# Patient Record
Sex: FEMALE | Race: WHITE
[De-identification: names, ages, dates, MRNs, and addresses within clinical notes are randomized per-mention and may not be internally consistent; named-entity substitution may affect disease eponyms.]

---

## 2017-06-13 NOTE — UC
Skin Complaint HPI





- HPI Summary


HPI Summary: 





53 yo female with the onset of left axillary discomfort earlier today


when she got home she noted redness surround skin graft site


had a BCC removed from left anterior shoulder


skin graft 3/17





no f/c


no myalgias











- History of Current Complaint


Chief Complaint: UCSkin


Time Seen by Provider: 06/13/17 18:22


Stated Complaint: SKIN GRAFT PROBLEM


Hx Obtained From: Patient, Family/Caretaker


Onset/Duration: Gradual Onset, Lasting Hours


Timing: Constant


Onset Severity: Mild


Current Severity: Moderate


Pain Intensity: 4


Character: Redness, Painful


Aggravating: Touch


Alleviating: Nothing


Associated Signs & Symptoms: Positive: Tenderness





- Allergy/Home Medications


Allergies/Adverse Reactions: 


 Allergies











Allergy/AdvReac Type Severity Reaction Status Date / Time


 


Adhesive Tape Allergy Intermediate very Verified 03/21/17 10:34





   sensitive  





   to  





   adhesives  











Home Medications: 


 Home Medications





Omeprazole 40 mg PO BID 06/13/17 [History Confirmed 06/13/17]











Review of Systems


Constitutional: Negative


Skin: Negative


Eyes: Negative


ENT: Negative


Respiratory: Negative


Cardiovascular: Negative


Gastrointestinal: Negative


Genitourinary: Negative


Motor: Negative


Neurovascular: Negative


Musculoskeletal: Negative


Neurological: Negative


Psychological: Negative


All Other Systems Reviewed And Are Negative: Yes





PMH/Surg Hx/FS Hx/Imm Hx


GI/ History: Gastroesophageal Reflux


Neurological History: Seizures


Cancer History: Breast Cancer





- Surgical History


Surgical History: Yes


Surgery Procedure, Year, and Place: RT BREAST LUMPECTOMY(CANCEROUS) 2002/03.  

ORIF right tibia 1999





- Social History


Alcohol Use: None


Substance Use Type: None


Smoking Status (MU): Never Smoked Tobacco





- Immunization History


Most Recent Influenza Vaccination: 2014


Most Recent Tetanus Shot: unknown, states up to date


Most Recent Pneumonia Vaccination: never





Physical Exam


Triage Information Reviewed: Yes


Appearance: Well-Appearing, No Pain Distress, Well-Nourished


Vital Signs: 


 Initial Vital Signs











Temp  97.0 F   06/13/17 17:10


 


Pulse  92   06/13/17 17:10


 


Resp  18   06/13/17 17:10


 


BP  153/91   06/13/17 17:10


 


Pulse Ox  95   06/13/17 17:10











Vital Signs Reviewed: Yes


Eyes: Positive: Conjunctiva Clear


ENT: Positive: Hearing grossly normal.  Negative: Nasal congestion, Nasal 

drainage, Trismus, Muffled/hoarse voice


Neck: Positive: Supple, Nontender, No Lymphadenopathy


Respiratory: Positive: Lungs clear, Normal breath sounds, No respiratory 

distress, No accessory muscle use


Cardiovascular: Positive: RRR, No Murmur


Neurological: Positive: Other: - right hemiparesis


Psychological Exam: Normal


Skin: Positive: Other - see image





Course/Dx





- Diagnoses


Provider Diagnoses: cellulitis





Discharge





- Discharge Plan


Condition: Stable


Disposition: HOME


Prescriptions: 


Cephalexin CAP* [Keflex CAP*] 500 mg PO QID #28 cap


Patient Education Materials:  Cellulitis (ED)


Referrals: 


Allan Rodriguez MD [Primary Care Provider] - 


Additional Instructions: 


This should get rechecked in 1-2 days





Call your plastic surgeon 











Images


Front/Back of Body, Lg (Mono): 


  __________________________














  __________________________





 1 - 10x 7 cm area of erthyma surrounding skin graft site/no fluctuance drainage

## 2017-10-07 ENCOUNTER — HOSPITAL ENCOUNTER (EMERGENCY)
Dept: HOSPITAL 25 - UCCORT | Age: 52
Discharge: HOME | End: 2017-10-07
Payer: MEDICARE

## 2017-10-07 VITALS — DIASTOLIC BLOOD PRESSURE: 87 MMHG | SYSTOLIC BLOOD PRESSURE: 157 MMHG

## 2017-10-07 DIAGNOSIS — R03.0: ICD-10-CM

## 2017-10-07 DIAGNOSIS — L25.9: Primary | ICD-10-CM

## 2017-10-07 PROCEDURE — G0463 HOSPITAL OUTPT CLINIC VISIT: HCPCS

## 2017-10-07 PROCEDURE — 99212 OFFICE O/P EST SF 10 MIN: CPT

## 2017-10-07 NOTE — UC
Skin Complaint HPI





- HPI Summary


HPI Summary: 





51 y/o female presents to the urgent care accompany by Social; Worker c/o rash 

in her mid chest that itches a lot for the past 3 days. she also states she has 

a black mole in the RT lateral side of her chest and she is concer it is 

cancerous. Pt states she had a BCC removed in 3/21/2017 removed by Dr Anna Chris. It got infected at the beginning, but now is healing well. She has 

not taking anything for the rash. Pt denies fever, pain, SOB, chest pain, N/V/

D. 





- History of Current Complaint


Chief Complaint: UCSkin


Time Seen by Provider: 10/07/17 11:17


Stated Complaint: SKIN COMPLAINT


Hx Obtained From: Patient, Family/Caretaker - Socail Worker


Hx Last Menstrual Period: menopausal


Pregnant?: No


Onset/Duration: Gradual Onset, Lasting Days - 3 days, Still Present


Skin Exposure Onset/Duration: Days Ago - 3


Timing: Constant


Onset Severity: Mild


Current Severity: Mild


Pain Intensity: 0


Pain Scale Used: 0-10 Numeric


Location: Discrete - mid chest


Character: Pruritus, Redness


Aggravating Factor(s): Touch


Alleviating Factor(s): Cold


Associated Signs & Symptoms: Positive: Negative.  Negative: Nausea, Vomiting, 

Fever, Chest Pain, Throat Tightening


Related History: Possible Reaction to: Environmental Exposure





- Allergy/Home Medications


Allergies/Adverse Reactions: 


 Allergies











Allergy/AdvReac Type Severity Reaction Status Date / Time


 


Adhesive Tape Allergy Intermediate very Verified 10/07/17 11:06





   sensitive  





   to  





   adhesives  











Home Medications: 


 Home Medications





Sertraline* [Zoloft*] 25 mg PO DAILY 10/07/17 [History Confirmed 10/07/17]


Sertraline* [Zoloft*] 50 mg PO DAILY 10/07/17 [History Confirmed 10/07/17]











Review of Systems


Constitutional: Negative


Skin: Rash - mid chest, Other - black mole in the RT lateral side of chest


Eyes: Negative


ENT: Negative


Respiratory: Negative


Cardiovascular: Negative


Gastrointestinal: Negative


Genitourinary: Negative


Motor: Negative


Neurovascular: Negative


Musculoskeletal: Negative


Neurological: Negative


Psychological: Negative


Is Patient Immunocompromised?: No


All Other Systems Reviewed And Are Negative: Yes





PMH/Surg Hx/FS Hx/Imm Hx


Previously Healthy: Yes


Endocrine History: Hypothyroidism


GI/ History: Gastroesophageal Reflux


Other GI/ History: Neurogenic bladder


Other Neurological History: Bell's Palsy


Cancer History: Breast Cancer


Other Cancer History: BCC of left anterior shoulder





- Surgical History


Surgical History: Yes


Surgery Procedure, Year, and Place: RT BREAST LUMPECTOMY(CANCEROUS) 2002/03.  

ORIF right tibia 1999





- Family History


Known Family History: Positive: Hypertension





- Social History


Occupation: Disabled


Lives: Group Home


Alcohol Use: None


Substance Use Type: None


Smoking Status (MU): Never Smoked Tobacco





- Immunization History


Most Recent Influenza Vaccination: 2014


Most Recent Tetanus Shot: unknown, states up to date


Most Recent Pneumonia Vaccination: never





Physical Exam


Triage Information Reviewed: Yes


Appearance: Well-Appearing, No Pain Distress, Well-Nourished, Obese


Vital Signs: 


 Initial Vital Signs











Temp  98.3 F   10/07/17 11:07


 


Pulse  66   10/07/17 11:07


 


Resp  16   10/07/17 11:07


 


BP  157/87   10/07/17 11:07


 


Pulse Ox  96   10/07/17 11:07











Vital Signs Reviewed: Yes


Eye Exam: Normal


Eyes: Positive: Conjunctiva Clear - PERRLA, EOMI


ENT: Positive: Normal ENT inspection, Hearing grossly normal, Pharynx normal, 

TMs normal - B/L eaternal ear canala clear


Neck: Positive: Supple, Nontender, No Lymphadenopathy


Respiratory: Positive: Chest non-tender, Lungs clear, Normal breath sounds, No 

respiratory distress


Cardiovascular: Positive: RRR, No Murmur, Pulses Normal, Brisk Capillary Refill


Abdomen Description: Positive: Nontender, No Organomegaly, Soft.  Negative: CVA 

Tenderness (R), CVA Tenderness (L)


Bowel Sounds: Positive: Present


Musculoskeletal: Positive: Strength Intact, ROM Intact, No Edema


Neurological Exam: Normal


Psychological Exam: Normal


Psychological: Positive: Normal Response To Family


Skin: Positive: rashes - mid chest with discrete erythematous discrete 

maculopapular eruption, non tender to palpation about [id number] in size. signs of 

scoriation observed, no signs of infection. Right Lateral side of chest eith a 

black mole with irregular borders, about [id number] in size, non tender to 

palpation.





Course/Dx





- Course


Course Of Treatment: 51 y/o female presents to the urgent care accompany by 

Social; Worker c/o rash in her mid chest that itches a lot for the past 3 days. 

she also states she has a black mole in the RT lateral side of her chest and 

she is concer it is cancerous. Pt states she had a BCC removed in 3/21/2017 

removed by Dr Anna Chris. It got infected at the beginning, but now 

is healing well. She has not taking anything for the rash. Pt denies fever, pain

, SOB, chest pain, N/V/D. Hx obtained. Pt with a possible contact dermatitis in 

her mid chest on examination. Pt Rx Triamcinolone Topical cream and Benadryl PO 

to alleviate pruritus. Pt and  strongly advised to f/u with 

Dermatologis Dr Chris for further evalaution in her mole. Pt's BP elevated 

today. Advised to decrease salt in his diet, monitor BP and if it continues to 

be elevated to f/u with PCP. Pt and  understood and agreed, left 

the clinic ambulating.





- Differential Diagnoses - Skin Complaint


Differential Diagnoses: Abscess, Cellulitis, Contact Dermatitis, Eczema, Local 

Allergic Reaction, Tinea, Urticaria





- Diagnoses


Provider Diagnoses: 1- Acute contact dermatitis of chest.  2-Elevated BP w/o Hx 

of HTN





Discharge





- Discharge Plan


Condition: Stable


Disposition: HOME


Prescriptions: 


Triamcinolone 0.1% CREAM(NF) [Kenalog Cream 0.1%(NF)] 1 applic TOPICAL BID #1 

tube


diPHENhydraMINE PO* [Benadryl PO 25 MG TAB*] 25 mg PO TID PRN #15 tab


 PRN Reason: Pruritis


Patient Education Materials:  Contact Dermatitis (ED), Low Sodium Diet (ED)


Referrals: 


Allan Rodriguez MD [Primary Care Provider] - 3 Days


Anna Chris [Medical Doctor] - 3 Days


Additional Instructions: 


1-Please apply medication as directed. Take Benadryl PO to alleviate itchiness. 


2-Please f/u with your Dermatologist Dr Chris for further evaluation and 

treatment on the mole


3-If symptoms do not improve or worsen please f/u with your PCP or return to 

the urgent care for further evaluation and treatment.


4- Your BP today is elevated, please decrease salt in your diet, monitor your BP

, if it continues to be elevated please f/y with your PCP for further 

management.

## 2019-02-03 ENCOUNTER — HOSPITAL ENCOUNTER (EMERGENCY)
Dept: HOSPITAL 25 - UCCORT | Age: 54
Discharge: HOME | End: 2019-02-03
Payer: MEDICARE

## 2019-02-03 VITALS — DIASTOLIC BLOOD PRESSURE: 95 MMHG | SYSTOLIC BLOOD PRESSURE: 163 MMHG

## 2019-02-03 DIAGNOSIS — S90.31XA: Primary | ICD-10-CM
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
## 2019-02-03 DIAGNOSIS — Z91.09: ICD-10-CM
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
## 2019-02-03 PROCEDURE — G0463 HOSPITAL OUTPT CLINIC VISIT: HCPCS

## 2019-02-03 PROCEDURE — 99212 OFFICE O/P EST SF 10 MIN: CPT

## 2019-02-03 NOTE — UC
Lower Extremity/Ankle HPI





- HPI Summary


HPI Summary: 





Dropped a tablet on the right foot. There is bruising and tenderness. No limp. 





- History of Current Complaint


Chief Complaint: UCLowerExtremity


Stated Complaint: RIGHT FOOT COMPLAINT


Time Seen by Provider: 02/03/19 09:00


Hx Obtained From: Patient, Family/Caretaker


Hx Last Menstrual Period: menopausal


Pregnant?: No


Onset/Duration: Sudden Onset, Lasting Days


Severity Initially: Mild


Severity Currently: Mild


Pain Intensity: 0


Aggravating Factor(s): Other - palpation.


Alleviating Factor(s): Rest


Able to Bear Weight: Yes





- Allergies/Home Medications


Allergies/Adverse Reactions: 


 Allergies











Allergy/AdvReac Type Severity Reaction Status Date / Time


 


Adhesive Tape Allergy Intermediate very Verified 02/03/19 08:51





   sensitive  





   to  





   adhesives  











Home Medications: 


 Home Medications





Budesonide [Budesonide ER] 3 mg PO DAILY 02/03/19 [History Confirmed 02/03/19]











PMH/Surg Hx/FS Hx/Imm Hx


Previously Healthy: No - MR. Group home. 





- Surgical History


Surgical History: Yes


Surgery Procedure, Year, and Place: RT BREAST LUMPECTOMY(CANCEROUS) 2002/03.  

ORIF right tibia 1999





- Family History


Known Family History: Positive: Hypertension





- Social History


Alcohol Use: None


Substance Use Type: None


Smoking Status (MU): Never Smoked Tobacco





- Immunization History


Most Recent Influenza Vaccination: 2014


Most Recent Tetanus Shot: unknown, states up to date


Most Recent Pneumonia Vaccination: never





Review of Systems


All Other Systems Reviewed And Are Negative: Yes


Musculoskeletal: Positive: Other: - bruising.





Physical Exam


Triage Information Reviewed: Yes


Appearance: Well-Appearing, No Pain Distress, Well-Nourished


Vital Signs: 


 Initial Vital Signs











Temp  97.6 F   02/03/19 08:49


 


Pulse  65   02/03/19 08:49


 


Resp  16   02/03/19 08:49


 


BP  163/95   02/03/19 08:49


 


Pulse Ox  95   02/03/19 08:49











Vital Signs Reviewed: Yes


Eyes: Positive: Conjunctiva Clear


ENT: Positive: Normal ENT inspection


Neck: Positive: Supple, Nontender, No Lymphadenopathy


Respiratory: Negative: Respiratory distress


Cardiovascular: Positive: Brisk Capillary Refill


Abdomen Description: Negative: Distended


Musculoskeletal: Positive: Strength Intact, ROM Intact, Other: - right dorsum 

foot bruising. THEre is right 1st metatarsal tenderness mid shaft.


Neurological: Positive: Alert, Muscle Tone Normal.  Negative: Fatigued


Psychological: Positive: Age Appropriate Behavior


Skin: Positive: Other - bruising..  Negative: Rashes





Diagnostics





- Radiology


  ** No standard instances


Radiology Interpretation Completed By: ED Physician - no fracture.





Lower Extremity Course/Dx





- Differential Dx/Diagnosis


Provider Diagnosis: 


 Contusion of right foot, initial encounter








Discharge





- Sign-Out/Discharge


Documenting (check all that apply): Patient Departure


All imaging exams completed and their final reports reviewed: No





- Discharge Plan


Condition: Good


Disposition: HOME


Patient Education Materials:  Foot Contusion (ED)


Referrals: 


Allan Rodriguez MD [Primary Care Provider] - 





- Billing Disposition and Condition


Condition: GOOD


Disposition: Home

## 2019-02-03 NOTE — XMS REPORT
Continuity of Care Document (CCD)

 Created on:2019



Patient:Marya Bella

Sex:Female

:1965

External Reference #:[id number]





Demographics







 Address  [address] [place] [id number]

 

 Home Phone  [phone]

 

 Mobile Phone  [phone]

 

 Preferred Language  en

 

 Marital Status  Not  or 

 

 Confucianism Affiliation  Unknown

 

 Race  White

 

 Ethnic Group  Not  or 









Author







 Name  Emma Pichardo PA-C

 

 Address  [address] [place] [address] Road



   Unavailable



   [place] [address]









Care Team Providers







 Name  Role  Phone

 

 Whaley,Julieth,NP  Care Team Information   Unavailable

 

 Julieth Whaley NP  Primary Care Physician  Unavailable









Payers







 Type  Date  Identification Numbers  Payment Provider  Subscriber

 

     Policy Number: [id number]  Medicare  Marya Bella









 PayID: [id number]  [facility]









 [address]

 

 [place] [address]









     Policy Number: [id number]  Medicaid  Marya Bella









 Group Name: 1 1  [facility]

 

 PayID: [id number]  [address]









 [place] [address]







Advance Directives







 Description

 

 No Information Available







Problems







 Date  Description  Provider  Status

 

 Onset: 2015  Gastroesophageal reflux disease  Sven Solorzano MD  Active

 

 Onset: 2015  Seizure  Sven Solorzano MD  Active

 

 Onset: 2015  Cerebral palsy  Sven Solorzano MD  Active

 

 Onset: 2015  Malignant neoplasm of female  Sven Solorzano MD  Active



   breast    

 

 Onset: 2018  Collagenous colitis  Emma Pichardo PA-C  Active

 

 Onset: 2018  Diarrhea  Emma Pichardo PA-C  Active

 

 Onset: 2018  Incontinence of feces  Emma Pichardo PA-C  Active







Family History







 Description

 

 No Information Available







Social History







 Type  Date  Description  Comments

 

 Birth Sex    Unknown  

 

 ETOH Use    Denies alcohol use  

 

 Tobacco Use  Start: Unknown  Patient has never smoked  

 

 Smoking Status  Reviewed: 19  Patient has never smoked  







Allergies, Adverse Reactions, Alerts







 Date  Description  Reaction  Status  Severity  Comments

 

 2015  NKDA    Active    

 

 2018  Adhesives    Active    







Medications







 Medication  Date  Status  Form  Strength  Qnty  SIG  Indications  Ordering



                 Provider

 

 Budesonide    Active  Caps DR Part  3mg  30cap  take 1  K52.831  Lucy



           s  capsule by    Foor-Pess



             mouth once    in, MD



             daily    

 

 Metamucil    Active  Packet  28%  30uni  1 packet  R15.9  Emma



 Smooth Texture          ts  mixed with 8    L.



 Fiber Singles            oz. of water    Swanstrom



             daily 2    , PA-C



             hours before    



             or after    



             other    



             meds-give AT    



             6 am daily    

 

 Ferrous  12/15  Active  Tablets  324(37.5F  60tab  use  1    Breiman,



 Gluconate  /      e) mg  s  tablet  bid    MD Allan

 

 Caltrate 600+D  10/09  Active  Tablets  600-800mg  60tab  Take 1    Breiman,



         -Unit  s  Tablet By    Tamiko Lemus Twice    MD



             A Day    



             (Osteoporosi    



             s)    

 

 Bacitracin    Active  Ointment  500Unit/G  30uni  apply to    Breiman,



 (External)        M  ts  small    Allan



             cuts/nancy HOFF



             ns as needed    

 

 Nystatin    Active  Cream  [id number]  30uni  Apply Under    Breiman,



         t/GM  ts  Breast And    Allan



             Abdomin Area    MD



             Folds as    



             Needed    



             (Fungal    



             Rash)    

 

 Xanax    Active  Tablets  0.5mg  2tabs  1 by mouth    Kikiimalatricia          prior to    Allan



             exam, may    MD



             repeat once    

 

 Phenytoin    Active  Capsules  100mg  30cap  1 by mouth    Unknown



 Sodium  /0000        s  every day in    



 Extended            morning and    



             2 by mouth    



             qpm    

 

 Omeprazole    Active  Capsules DR  40mg  60cap  1 by mouth    Sven A.



   /0000        s  twice a day    Rashad HOFF

 

 Sertraline HCL    Active  Tablets  25mg        Unknown



   /0000              

 

 Oxybutynin    Active  Tablets ER  10mg        Unknown



 Chloride ER  /0000    24HR          

 

 Oxybutynin    Active  Tablets ER  5mg        Unknown



 Chloride ER  /0000    24HR          

 

 Levothyroxine    Active  Tablets  50mcg        Unknown



 Sodium  /0000              

 

 Sertraline HCL    Active  Tablets  50mg        Unknown



   /0000              

 

 Acetaminophen    Active  Tablets  325mg    2 by mouth    Unknown



   /0000          every 8    



             hours as    



             needed    

 

 Biscolax    Active  Suppository  10mg    1 per rectum    Unknown



   /0000          every day as    



             needed    



             constipation    

 

 Milk Of    Active  Suspension  1200mg/15    30ml by    Unknown



 Magnesia  /0000      ML    mouth every    



             day as    



             needed    



             constipation    



             if no bm in    



             3 days    

 

 Mupirocin    Active  Ointment  2%    apply    Unknown



   /0000          topically    



             three times    



             a day to    



             affected    



             area(s)    

 

 Guaifenesin    Active  Liquid  100mg/5ML    10ml by    Unknown



   /0000          mouth four    



             times a day    



             as needed    



             cough    

 

                 

 

 Colyte With    Hx  Solution Rec  240gm  4000m  by mouth as    Emma



 Flavor Packs          l  directed    L.



   -              Swanstrom



   07/10              , PA-C



                 

 

 Budesonide    Hx  Caps DR Part  3mg  60cap  2 every day    Emma



   /        s      L.



   -              Swanstrom



                 , PA-C



                 

 

 Ondansetron    Hx  Tablets  4mg        Unknown



   /0000    Dispers          



   -              



                 







Immunizations







 Description

 

 No Information Available







Vital Signs







 Date  Vital  Result  Comment

 

 2019 10:11am  Height  60 inches  5'0"









 Weight  150.00 lb  

 

 BP Systolic  140 mmHg  

 

 BP Diastolic  96 mmHg  

 

 Heart Rate  66 /min  

 

 BMI (Body Mass Index)  29.3 kg/m2  









 2018  9:22am  Height  60 inches  5'0"









 Weight  139.00 lb  

 

 BP Systolic  162 mmHg  

 

 BP Diastolic  90 mmHg  

 

 Heart Rate  60 /min  

 

 BMI (Body Mass Index)  27.1 kg/m2  









 2018 10:56am  Height  60 inches  5'0"









 Weight  135.00 lb  

 

 BP Systolic  140 mmHg  

 

 BP Diastolic  92 mmHg  

 

 Heart Rate  62 /min  

 

 BMI (Body Mass Index)  26.4 kg/m2  









 2016  2:08pm  Height  64 inches  5'4"









 Weight  165.00 lb  

 

 BP Systolic  159 mmHg  

 

 BP Diastolic  96 mmHg  

 

 Heart Rate  90 /min  

 

 BMI (Body Mass Index)  28.3 kg/m2  









 2015  1:56pm  Height  64 inches  5'4"









 Weight  141.00 lb  

 

 BP Systolic  138 mmHg  

 

 BP Diastolic  90 mmHg  

 

 Heart Rate  66 /min  

 

 BMI (Body Mass Index)  24.2 kg/m2  







Results







 Test  Date  Facility  Test  Result  H/L  Range  Note

 

 Laboratory test    [facility]  Surgical  SEE RESULT      1



 finding  8    Interface  BELOW      



       Order        

 

 SOB X 3 (Gai)    [facility]  SOB 1St Sample  NEG    
  



   8  [facility]  ([facility])        



     [place] [address] [phone]          









 SOB 2ND Sample (Gai)  NEG      

 

 SOB 3RD Sample (Gai)  NEG      









 CBC W/Auto  2018  [facility]  White  6.1 3/UL    4.8-
10.8  



 Differential(!)    [facility]  Blood        



     [place] [address]  Count Ser        



     [phone]  Auto CNT        









 RBC Red Blood Count  4.69 X106/UL    4.20-6.20  

 

 Hemoglobin Blood  14.8 g/dL    12.0-18.0  

 

 Hematocrit  45.9 %    35-52  

 

 MCV (Corpuscular Volume)  97.8 FL  High  79-97  

 

 MCH (Corpuscular Hemoglobin)  31.6 pg  High  27-31  

 

 MCHC (Corpuscular Hemog Conc)  32.3 g/dL    32.0-36.0  

 

 RDW  16.1 %  High  10.5-15.0  

 

 Platelet Count Blood Auto CNT  225 X103/UL    150-450  

 

 MPV  8.2 FL    7.4-10.4  

 

 Lymph%  30.3 %    20.0-45.0  

 

 Mono%  12.1 %  High  1.0-9.0  

 

 Neutrophil %  57.6 %    38.0-83.0  

 

 Absolute Lymphocytes  1.8 X103/UL    1.0-4.8  

 

 Absolute Monocytes  0.7 X103/UL    0.0-0.8  

 

 Absolute Neutrophils  3.5 X103/UL    1.5-7.7  









 Laboratory test  2018  [facility]  Ferritin  77.1 ng/dL
      



 finding    [facility] [facility]  Ser/Plas        



     [place] [address]  Mass/Vol(!)        



     [phone]          

 

 Iron & Iron  2018  [facility]  Iron  147 g/dL  N    



 Binding Capacity              









 Unsaturated Iron Binding  157 g/dL      

 

 Total Iron Binding Capacity  304 g/dL  N  250-450  

 

 Transferrin  217 mg/dL  N  203-362  

 

 % Iron Saturation  48 %  N  15-55  









 Laboratory test  2017  N2N/CCD Import  Hematocrit  30.3 %  Low  36.1 -  



 finding      (Fma/CMC/CTX)      50.3  









 Hemoglobin ([facility]ma/[facility]/CTX)  9.0 g/dL  Low  12.1 - 17.2  

 

 Lymph%  52.4 %  High  17.0-48.0  

 

 Mean Corpuscular Hemo Concen  29.6 1  Low  32.0-36.0  

 

 Mean Corpuscular Hemoglobin  17.9 1  Low  27.6-33.3  

 

 Mean Corpuscular Vol  61 1  Low  82.2-97.4  

 

 Mean Platelet Volume  9.7 1    5.5-11.0  

 

 Mixed%  5.6 1      

 

 Neutrophils %  42.0 1      

 

 Platelets  375 10^3/uL    150-400  

 

 RBC  5.00 1    3.90-5.70  

 

 RDW  19.8 1  High  11.6-13.7  

 

 WBC  6.5 1    3.6-9.6  









 Laboratory test  2016  [facility]  Surgical  SEE RESULT      2, 3



 finding      Interface Order  BELOW      

 

 Laboratory test  2016  N2N/CCD Import  Hematocrit  28.9 %  Low  36.1 -  



 finding      (Fma/CMC/CTX)      50.3  









 Hemoglobin (Fma/CMC/CTX)  8.9 g/dL  Low  12.1 - 17.2  

 

 Lymph%  31.3 %    17.0-48.0  

 

 Mean Corpuscular Hemo Concen  30.7 1  Low  32.0-36.0  

 

 Mean Corpuscular Hemoglobin  20.0 1  Low  27.6-33.3  

 

 Mean Corpuscular Vol  65 1  Low  82.2-97.4  

 

 Mean Platelet Volume  8.5 1    5.5-11.0  

 

 Mixed%  5.8 1      

 

 Neutrophils %  62.9 1      

 

 Platelets  332 10^3/uL    150-400  

 

 RBC  4.44 1    3.90-5.70  

 

 RDW  18.7 1  High  11.6-13.7  

 

 WBC  6.9 1    3.6-9.6  









 Laboratory test  06/15/2015  [facility]  Surgical  SEE RESULT      4



 finding      Interface Order  BELOW      

 

 Laboratory test  2015  N2N/CCD Import  Hematocrit  30.2 %  Low  36.1 -  



 finding      (Fma/CMC/CTX)      50.3  









 Hemoglobin (Fma/CMC/CTX)  9.0 g/dL  Low  12.1 - 17.2  

 

 Lymph%  24.8 %    17.0-48.0  

 

 Mean Corpuscular Hemo Concen  29.7 1  Low  32.0-36.0  

 

 Mean Corpuscular Hemoglobin  19.7 1  Low  27.6-33.3  

 

 Mean Corpuscular Vol  66 1  Low  82.2-97.4  

 

 Mean Platelet Volume  6.9 1    5.5-11.0  

 

 Mixed%  4.5 1      

 

 Neutrophils %  70.7 1      

 

 Platelets  404 10^3/uL  High  150-400  

 

 RBC  4.57 1    3.90-5.70  

 

 RDW  24.5 1  High  11.6-13.7  

 

 WBC  7.9 1    3.6-9.6  









 CBC Auto Diff  2015  N2N/CCD Import  Abs Basophils  0.1 10^3/uL    0-0.2
  5









 Abs Eosinophils  0.1 10^3/uL    0-0.6  

 

 Abs Lymphocytes  1.4 10^3/uL    1.0-4.8  

 

 Abs Monocytes  0.6 10^3/uL    0-0.8  

 

 Abs Neutrophils  6.6 10^3/uL    1.5-7.7  

 

 Abs Nucleated RBC  0 10^3/uL      

 

 Basophil %  0.6 %    0-2  

 

 Eosinophil %  0.8 %    0-6  

 

 Granulocyte %  76.2 %    38-83  

 

 Hematocrit  20 %  Low  35-47  

 

 Hemoglobin  5.9 g/dL  Low  12.0-16.0  6

 

 Lymphocyte %  15.7 %  Low  25-47  

 

 Mean Corpuscular HGB Conc  30 g/dL  Low  31-36  

 

 Mean Corpuscular Hemoglobin  17 pg  Low  27-31  

 

 Mean Corpuscular Volume  56 fL  Low  80-97  

 

 Mean Platelet Volume  9 um3    7.4-10.4  

 

 Monocyte %  6.7 %    1-9  

 

 Nucleated Red Blood Cells %  0 1      

 

 Platelet Count  337 10^3/uL    150-450  

 

 Red Blood Count  3.48 10^6/uL  Low  4.0-5.4  

 

 Red Cell Distribution Width  21 %  High  10.5-15  

 

 White Blood Count  8.6 10^3/uL    4.8-10.8  









 Cell Morphology  2015  N2N/CCD Import  Elliptocyte  1+      









 Hypochromasia  3+      

 

 Microcytosis  2+      









 1  SEE RESULT BELOW



   -----------------------------------------------------------------------------
---------------



   Name:  MARYA BELLA               : 1965    Attend Dr: Tanya Gale DO



   Acct:  [id number]  Unit: [id number]  AGE: 53            Location:  [facility]



   Re/07/18                        SEX: F             Status:    DEP REF



   -----------------------------------------------------------------------------
---------------



   



   SPEC: [id number]             PRIYANKA:       [facility] DR: Tanya Gale DO



   REQ:  [id number]             RECD: 



   STATUS: SOFÍA TAPIA DR: Allan Rodriguez MD



   _



   ORDERED:  LEVEL 4



   



   FINAL DIAGNOSIS



   



   



   Colon, random biopsies:



   -- Mild to moderate collagenous colitis.



   -- No active colitis or evidence of other chronic inflammatory bowel process 
identified.



   



   



   



   CLINICAL HISTORY



   



   52 year old female here for fecal incontinence, diarrhea, anemia



   



   POST-OPERATIVE DIAGNOSIS



   



   Colonoscopy: normal terminal ileum; small non-bleeding internal hemorrhoids; 
no polyps; good



   prep; biopsies for microscopic colitis



   



   GROSS DESCRIPTION



   



   The specimen is received in formalin labeled, Random Colon Biopsies, and 
consists of a 1.0



   by up to 0.7 x 0.2 cm aggregate of tan irregular soft tissue fragments which 
is submitted



   entirely in one cassette.



   



   Signed by and Reported on: __________              Kingsley Abernathy MD  1254



   



   -----------------------------------------------------------------------------
---------------



   



   



   



   



   



   



   



   



   



   



   ** END OF REPORT **



   



   DEPARTMENT OF PATHOLOGY,  [address] [place] [address]



   Phone # [phone]      Fax #[phone]



   Kingsley Abernathy M.D. Director     [facility] # [id number]



   



   SEE RESULT BELOW



   -----------------------------------------------------------------------------
---------------



   Name:  MARYA BELLA               : 1965    Attend Dr: Tanya Gale DO



   Acct:  [id number]  Unit: [id number]  AGE: 53            Location:  [facility]



   Re18                        SEX: F             Status:    DEP REF



   -----------------------------------------------------------------------------
---------------



   



   SPEC: [id number]             PRIYANKA:       [facility] DR: Tanya Gale DO



   REQ:  [id number]             RECD: 



   STATUS: SOFÍA TAPIA DR: Allan Rodriguez MD



   _



   ORDERED:  LEVEL 4



   



   FINAL DIAGNOSIS



   



   



   Colon, random biopsies:



   -- Mild to moderate collagenous colitis.



   -- No active colitis or evidence of other chronic inflammatory bowel process 
identified.



   



   



   



   CLINICAL HISTORY



   



   52 year old female here for fecal incontinence, diarrhea, anemia



   



   POST-OPERATIVE DIAGNOSIS



   



   Colonoscopy: normal terminal ileum; small non-bleeding internal hemorrhoids; 
no polyps; good



   prep; biopsies for microscopic colitis



   



   GROSS DESCRIPTION



   



   The specimen is received in formalin labeled, Random Colon Biopsies, and 
consists of a 1.0



   by up to 0.7 x 0.2 cm aggregate of tan irregular soft tissue fragments which 
is submitted



   entirely in one cassette.



   



   Signed by and Reported on: __________              Kingsley Abernathy MD  1254



   



   -----------------------------------------------------------------------------
---------------



   



   



   



   



   



   



   



   



   



   



   ** END OF REPORT **



   



   DEPARTMENT OF PATHOLOGY,  [address] [place] [address]



   Phone # [phone]      Fax #[phone]



   Kingsley Abernathy M.D. Director     [facility] # [id number]

 

 2  [id number]

 

 3  SEE RESULT BELOW



   -----------------------------------------------------------------------------
---------------



   Name:  MARYA BELLA               : 1965    Attend Dr: Sven Solorzano MD



   Acct:  [id number]  Unit: [id number]  AGE: 51            Location:  [facility]



   Re16                        SEX: F             Status:    REG REF



   -----------------------------------------------------------------------------
---------------



   



   SPEC: [id number]             PRIYANKA:       [facility] DR: Sven Solorzano MD



   REQ:  [id number]             RECD: 16-



   STATUS: SOFÍA TAPIA DR: Allan Rodriguez MD



   _



   ORDERED:  LEVEL IV



   COMMENTS: [id number]



   



   FINAL DIAGNOSIS



   



   



   Esophagus, distal, biopsy:



   -- Squamous and columnar mucosa with chronic inflammation and focal 
intestinal metaplasia.



   -- Negative for dysplasia.



   



   



   



   CLINICAL HISTORY



   



   Dysphagia, esophagitis, history of Jeffries's



   



   POST-OPERATIVE DIAGNOSIS



   



   Esophagus - short segment Jeffries's biopsied, slight Schatzki's ring dilated
; stomach -



   hiatal hernia, otherwise normal gastric mucosa; duodenum - normal bulb to 
third portion.



   Conclusions/Plan: Short segment Jeffries's biopsied, slight Schatzki's ring 
dilated, hiatal



   hernia



   



   GROSS DESCRIPTION



   



   The specimen is received in formalin labeled, Biopsy Distal Esophagus, and 
consists of a 0.7



   x 0.6 x 0.2 cm aggregate of tan irregular soft tissue fragments, which is 
submitted entirely



   in one cassette.



   



   Signed __________(signature on file)___________ Tahira Springer MD  1127



   



   -----------------------------------------------------------------------------
---------------



   



   



   



   



   



   



   



   ** END OF REPORT **



   



   * ML=Testing performed at Main Lab



   DEPARTMENT OF PATHOLOGY,  [address] [place] [address]



   Phone # [phone]      Fax #[phone]



   Kingsley Abernathy M.D. Director     [facility] # [id number]



   



   SEE RESULT BELOW



   -----------------------------------------------------------------------------
---------------



   Name:  MARYA BELLA               : 1965    Attend Dr: Sven Solorzano MD



   Acct:  [id number]  Unit: [id number]  AGE: 51            Location:  [facility]



   Re16                        SEX: F             Status:    REG REF



   -----------------------------------------------------------------------------
---------------



   



   SPEC: [id number]             PRIYANKA:       [facility] DR: Sven Solorzano MD



   REQ:  [id number]             RECD: 



   STATUS: SOFÍA TAPIA DR: Allan Rodriguez MD



   _



   ORDERED:  LEVEL IV



   COMMENTS: [id number]



   



   FINAL DIAGNOSIS



   



   



   Esophagus, distal, biopsy:



   -- Squamous and columnar mucosa with chronic inflammation and focal 
intestinal metaplasia.



   -- Negative for dysplasia.



   



   



   



   CLINICAL HISTORY



   



   Dysphagia, esophagitis, history of Jeffries's



   



   POST-OPERATIVE DIAGNOSIS



   



   Esophagus - short segment Jeffries's biopsied, slight Schatzki's ring dilated
; stomach -



   hiatal hernia, otherwise normal gastric mucosa; duodenum - normal bulb to 
third portion.



   Conclusions/Plan: Short segment Jeffries's biopsied, slight Schatzki's ring 
dilated, hiatal



   hernia



   



   GROSS DESCRIPTION



   



   The specimen is received in formalin labeled, Biopsy Distal Esophagus, and 
consists of a 0.7



   x 0.6 x 0.2 cm aggregate of tan irregular soft tissue fragments, which is 
submitted entirely



   in one cassette.



   



   Signed __________(signature on file)___________ Tahira Springer MD  1127



   



   -----------------------------------------------------------------------------
---------------



   



   



   



   



   



   



   



   ** END OF REPORT **



   



   * ML=Testing performed at Main Lab



   DEPARTMENT OF PATHOLOGY,  [address] [place] [address]



   Phone # [phone]      Fax #[phone]



   Kingsley Abernathy M.D. Director     [facility] # [id number]

 

 4  SEE RESULT BELOW



   -----------------------------------------------------------------------------
---------------



   Name:  MARYA BELLA               : 1965    Attend Dr: Sven Solorzano MD



   Acct:  [id number]  Unit: [id number]  AGE: 50            Location:  [facility]



   Re/15/15                        SEX: F             Status:    REG REF



   -----------------------------------------------------------------------------
---------------



   



   SPEC: [id number]             PRIYANKA: 06/15/15-          SUBM DR: Sven Solorzano MD



   REQ:  [id number]             RECD: 06/15/



   STATUS: SOFÍA TAPIA DR: Allan Rodriguez MD



   _



   ORDERED:  LEVEL IV



   



   FINAL DIAGNOSIS



   



   



   Gastroesophageal junction, biopsy:



   -- Focally eroded squamous and columnar mucosa with acute and chronic 
inflammation and



   intestinal metaplasia.



   -- Negative for dysplasia.



   



   



   



   



   CLINICAL HISTORY



   



   Gastroesophageal reflux disease, dysphagia



   



   POST-OPERATIVE DIAGNOSIS



   



   Esophagus - stricture at GE junction, dilated, ? Jeffries's biopsied, 
negative esophagitis;



   stomach - 3 cm. hiatal hernia, normal gastric mucosa; duodenum - normal bulb 
to third



   portion. Esophageal stricture dilated, hiatal hernia



   



   GROSS DESCRIPTION



   



   The specimen is received in formalin labeled, Biopsy GE Junction, and 
consists of a 0.6 x



   0.4 x 0.2 cm aggregate of multiple tan irregular soft tissue fragments, 
which is submitted



   entirely in one cassette.



   



   Signed __________(signature on file)___________ Tahira Springer MD 06/
17/15 1148



   



   -----------------------------------------------------------------------------
---------------



   



   



   



   



   



   



   



   ** END OF REPORT **



   



   * ML=Testing performed at Main Lab



   DEPARTMENT OF PATHOLOGY,  [address] [place] [address]



   Phone # [phone]      Fax #[phone]



   Kingsley Abernathy M.D. Director     [facility] # [id number]



   



   SEE RESULT BELOW



   -----------------------------------------------------------------------------
---------------



   Name:  MARYA BELLA               : 1965    Attend Dr: Sven Solorzano MD



   Acct:  [id number]  Unit: [id number]  AGE: 50            Location:  [facility]



   Re/15/15                        SEX: F             Status:    REG REF



   -----------------------------------------------------------------------------
---------------



   



   SPEC: [id number]             PRIYANKA: 06/15/15-          SUBM DR: Sven Solorzano MD



   REQ:  [id number]             RECD: 06/15/



   STATUS: SOFÍA TAPIA DR: Allan Rodriguez MD



   _



   ORDERED:  LEVEL IV



   



   FINAL DIAGNOSIS



   



   



   Gastroesophageal junction, biopsy:



   -- Focally eroded squamous and columnar mucosa with acute and chronic 
inflammation and



   intestinal metaplasia.



   -- Negative for dysplasia.



   



   



   



   



   CLINICAL HISTORY



   



   Gastroesophageal reflux disease, dysphagia



   



   POST-OPERATIVE DIAGNOSIS



   



   Esophagus - stricture at GE junction, dilated, ? Jeffries's biopsied, 
negative esophagitis;



   stomach - 3 cm. hiatal hernia, normal gastric mucosa; duodenum - normal bulb 
to third



   portion. Esophageal stricture dilated, hiatal hernia



   



   GROSS DESCRIPTION



   



   The specimen is received in formalin labeled, Biopsy GE Junction, and 
consists of a 0.6 x



   0.4 x 0.2 cm aggregate of multiple tan irregular soft tissue fragments, 
which is submitted



   entirely in one cassette.



   



   Signed __________(signature on file)___________ Tahira Springer MD 06/
17/15 1148



   



   -----------------------------------------------------------------------------
---------------



   



   



   



   



   



   



   



   ** END OF REPORT **



   



   * ML=Testing performed at Main Lab



   DEPARTMENT OF PATHOLOGY,  [address] [place] [address]



   Phone # [phone]      Fax #[phone]



   Kingsley Abernathy M.D. Director     [facility] # [id number]

 

 5  NO LAV RECIEVED CLARY NOTIFIED @1535

 

 6  Verbal to [id number] ED by [id number] at 1855 on 05/07/15.



   Results read back accurately.







Procedures







 Date  Code  Description  Status

 

 2016  88837  EGD/Ball. Dilation-Less Than 30mm  Completed

 

 2016  56442  EGD+Biopsy Single Or Multiple  Completed

 

 2015  02524  EGD/Ball. Dilation-Less Than 30mm  Completed

 

 06/15/2015  27127  EGD/Ball. Dilation-Less Than 30mm  Completed

 

 06/15/2015  22933  EGD+Biopsy Single Or Multiple  Completed

 

 2015  16171  EGD+Biopsy Single Or Multiple  Completed







Encounters







 Type  Date  Location  Provider  Dx  Diagnosis

 

 Office Visit  2018  [facility]  Emma MONTELONGO  K52.831  Collagenous



    9:30a  [facility]  Kylee    colitis



       PA-TAD    

 

 Office Visit  2018  [facility]  Emma MONTELONGO  R15.9  Full 
incontinence



   11:15a  [facility]    [facility]



       PA-C    









 R19.7  Diarrhea, unspecified

 

 D64.9  Anemia, unspecified









 Office Visit  2016  [facility]  Sven VELEZ  R13.10  Dysphagia,



   2:15p  [facility]    unspecified









 K22.2  Esophageal obstruction









 Office Visit  2015  [facility]  Sven VELEZ  787.20  Dysphagia,



   1:30p  [facility]    Unspecified







Plan of Treatment

2019 - OLEGARIO Courtney-CK52.831 Collagenous colitisNew Medication
:Budesonide 3 mg - take 1 capsule by mouth once daily

## 2019-02-03 NOTE — XMS REPORT
Continuity of Care Document (CCD)

 Created on:2019



Patient:Kati Bella

Sex:Female

:1965

External Reference #:[id number]





Demographics







 Address  [address] [place] [address]

 

 Home Phone  [phone]

 

 Mobile Phone  [phone]

 

 Work Phone  [phone]

 

 Preferred Language  en

 

 Marital Status  Not  or 

 

 Bahai Affiliation  Unknown

 

 Race  White

 

 Ethnic Group  Not  or 









Author







 Name  Tahira JAGUAR Mann

 

 Address  [address]



   Unavailable



   [place] [address]









Care Team Providers







 Name  Role  Phone

 

 Allan Rodriguez MD  Care Team Information   Unavailable

 

 Allan Rodriguez MD  Primary Care Physician  Unavailable









Payers







 Type  Date  Identification Numbers  Payment Provider  Subscriber

 

     Policy Number: [id number]  Medicare [facility]  Kati Bella









 PayID: [id number]  [address]









 [place] [address]









     Policy Number: [id number]  Medicaid [place]  Kati Bella









 PayID: [id number]  [address]









 [place] [address]







Advance Directives







 Description

 

 No Information Available







Problems







 Date  Description  Provider  Status

 

 Onset: 2014  Arthralgia of the lower leg  Allan Rodriguez M.D.  Active

 

 Onset: 2014  Hypothyroidism  Allan Rodriguez M.D.  Active

 

 Onset: 2014  Posterior rhinorrhea  Allan Rodriguez M.D.  Active

 

 Onset: 2015  Peptic reflux disease  Allan Rodriguez M.D.  Active

 

 Onset: 2015  Seizure  Allan Rodriguez M.D.  Active

 

 Onset: 2015  Cerebral palsy  Allan Rodriguez M.D.  Active

 

 Onset: 2015  Arthralgia of the ankle and/or foot  Allan Rodriguez M.D.  Active

 

 Onset: 2016  Pain in limb  Allan Rodriguez M.D.  Active

 

 Onset: 2017  Dysphagia  Allan Rodriguez M.D.  Active







Family History







 Description

 

 No Information Available







Social History







 Type  Date  Description  Comments

 

 Birth Sex    Unknown  

 

 Lives With    Roommate  in their own apartment



       with supervision from



       [facility] Staff

 

 Tobacco Use  Start: Unknown  Patient has never smoked  

 

 Smoking Status  Reviewed: 18  Patient has never smoked  







Allergies, Adverse Reactions, Alerts







 Description

 

 No Known Drug Allergies







Medications







 Medication  Date  Status  Form  Strength  Qnty  SIG  Indications  Ordering



                 Provider

 

 Benadryl Allergy    Active  Capsules  25mg  14cap  1 po q 12  L29.9  
Tahira        s  hrs prn    Johnathan,



             itching    FNP

 

 Zoloft    Active  Tablets  50mg  30tab  Take One  F41.9  Allan YANET



   /        s  Tablet By    Breiman,



             Mouth    M.D.



             Every    



             Morning    



             (Depressi    



             on)    

 

 Zoloft    Active  Tablets  25mg  30tab  Take One    Allan ADONAY.



   /        s  Tablet By    Breiman,



             Mouth    M.D.



             Every    



             Morning    



             (Depressi    



             on)    

 

 Ferrous  12/15  Active  Tablets  324(38Fe)  60tab  use  1    Allan HOLT



 Gluconate  /      mg  s  tablet    Breiman,



             once    M.D.



             daily    

 

 Caltrate 600+D  10/09  Active  Tablets  600-800mg  60tab  Take 1    Allan HOLT



   /      -Unit  s  Tablet By    Breiman,



             Mouth    M.D.



             Twice A    



             Day    



             (Osteopor    



             osis)    

 

 Multivitamin  10/09  Active      30uni  Take One    Allan HOLT



 Daily          ts  Tablet By    Breiman,



             Mouth    M.D.



             Every Day    



             (Suppleme    



             nt)    

 

 Prilosec    Active  Capsules DR  40mg  60cap  Take One    Allan J.



           s  Capsule    Breiman,



             By Mouth    M.D.



             2 Times A    



             Day    



             (GERD) as    



             Needed    

 

 Disposable    Active    Size  300Mo  Use as    Allan HOLT



 Underpads        Large  nth  needed    Jennifer



             for    M.D.



             incontine    



             nce dx:    



             r32    

 

 Oxybutynin    Active  Tablets ER  10mg  30tab  take 1    Allan HOLT



 Chloride ER  /    24HR    s  tablet by    Breiman,



             mouth    M.D.



             every day    

 

 Bed Pads    Active      1PK  use on  R32  Allan HOLT



   /          bed    Jennifer,



             nightly    M.D.



             as needed    



             Dx:    



             788.30    

 

 Large Disposable    Active      300un  pt uses    Allan HOLT



 Pull-Ups  /        its  10 daily    Jennifer,



             dx:    M.D.



             urinary    



             incontine    



             nce,    



             neurogeni    



             c bladder    



             r32    

 

 Dilantin    Active  Capsules  100mg  105ca  1 cap by  R56.9          ps  mouth    Johnathan,



             every day    FNP



             in in the    



             morning    



             and 2    



             caps by    



             mouth at    



             bedtime    

 

 Synthroid  01/10  Active  Tablets  50mcg  30tab  Take One  E03.9  Allan URIAS.



           s  Tablet By    Jennifer,



             Mouth    M.D.



             Every Day    



             (Hypothyr    



             oidism)    

 

 Metamucil Smooth    Active  Packet  28%        Unknown



 Texture Fiber  /0000          packet    



 Singles            with    



             large    



             glass    



             water    



             every in    



             the    



             morning,    



             every at    



             night    

 

                 

 

 Bactroban    Hx  Ointment  2%  22uni  Apply    Allan HOLT



           ts  Sparingly    Jennifer,



   -          To    M.D.



             Area(S)    



             Twice    



             Daily as    



             Needed    



             (Antibact    



             erial)    

 

 Zoloft    Hx  Tablets  25mg  30tab  Take One  F41.9  Allan URIAS.



           s  Tablet By    Jennifer,



   -          Mouth    M.D.



   11/06          Every    



   /2018          Morning    



             (Depressi    



             on)    

 

 Zoloft    Hx  Tablets  50mg  30tab  Take One  F41.9  Allan URIAS.



           s  Tablet By    Jennifer,



   -          Mouth    M.D.



   06/22          Every    



   /2018          Morning    



             (Depressi    



             on)    

 

 Zoloft    Hx  Tablets  50mg  30tab  Take One  F41.9  Allan URIAS.



           s  Tablet By    Jennifer,



   -          Mouth    M.D.



   01/02          Every    



   /2018          Morning    



             (Depressi    



             on)    

 

 Zoloft    Hx  Tablets  25mg  30tab  Take One  F41.9  Allan URIAS.



           s  Tablet By    Jennifer,



   -          Mouth    M.D.



   05/21          Every    



   /2018          Morning    



             (Depressi    



             on)    

 

 Note    Hx        No    Allan HOLT



             physical    Jennifer



   -          available    M.D.



             /          until Dec    



             2017    

 

 Zoloft    Hx  Tablets  50mg  30tab  Take One    Allan HOLT



           s  Tablet By    Jennifer,



   -          Mouth    M.D.



   12/05          Every    



   /2017          Morning    



             (Depressi    



             on)    

 

 Bacitracin    Hx  Ointment  500Unit/G  30gm  apply to    Allan HOLT



 (External)        Annette Warren          cuts/marvel    M.D.



             sions as    



   /2019          needed    

 

 Naprosyn    Hx  Tablets  375mg  14tab  1 by    Allan HOLT



           s  mouth    Jennifer,



   -          twice a    M.D.



             day with    



   /2017          meals    

 

 Work  Excuse    Hx        unable to    Allan HOLT



             work the    Jennifer,



   -          next  1-2    M.D.



   12/05           days    



   /2017          due  to    



             knee    



             injury    

 

 Clobetasol    Hx  Cream  0.05%  30gm  use twice    Allan HOLT



 Propionate  /2016          adadelfina Rodriguez,



   -          Shoulder    M.D.



             Rash    



                 

 

 Zoloft    Hx  Tablets  25mg  30tab  Take One  F41.9  Allan HOLT



           s  Tablet By    Jennifer,



   -          Mouth    M.D.



   01/02          Every    



   /2018          Morning    



             (Depressi    



             on)    

 

 Sertraline HCL    Hx  Tablets  50mg  30tab  Take 1            s  Tablet By    Angelo,



   -          Mouth One    FNP



   01/24          Time    



   /2019          Daily    

 

 Prednisone    Hx  Tablets  20mg  18tab  3 x 3    Allan HOLT



           s  days 2 x    Jennifer,



   -          3 days 1    M.D.



   08/17          x 3 days    



   /2016              

 

 Calcium    Hx  Tablets  600-400mg  60tab  Take 1    Allan HOLT



 Carbonate-Vit      -Unit  s  Tablet By    latricia Rodriguez D  -          Mouth    M.D.



   10/09          With    



   /2017          Breakfast    



             And Take    



             1 Tablet    



             By Mouth    



             With    



             Dinner    

 

 Note  11/10  Hx        pt needs    Allan HOLT



             aso    Jennifer,



   -          adjusted    M.D.



                 

 

 Physical Therapy    Hx        treatment    Allan HOLT



             and    Jennifer,



   -          evaluatio    M.D.



             n for    



             gait    



             training    



              and   r    



             hand    



             contractu    



             res    

 

 Omeprazole    Hx  Capsules DR  40mg  60cap  Take 1    Allan HOLT



           s  Capsule    Jennifer,



   -          By Mouth    M.D.



             Two Times    



             Daily    

 

 Prilosec    Hx  Capsules DR  20mg  30cap  1 by    Allan HOLT



           s  mouth bid    Jennifer,



   -              M.D.



                 

 

 Zofran Odt    Hx  Tablets  4mg  10tab  1 by    Allan HOLT



       Dispers    s  mouth    Jennifer,



   -          every 4-6    M.D.



             hours as    



   /2015          needed    



             nausea    

 

 Guiatuss ac    Hx  Syrup  100-10mg/  6FLOz  use 1-2    Allan HOLT



         5ML    teaspoon    Breiman,



   -          every 4    M.D.



             our               

 

 Amoxicillin    Hx  Capsules  500mg  16cap  twice a    Allan HOLT



           s  day x 8    Breiman,



   -          days    M.D.



                 

 

 Tab-A-Rhiannon    Hx  Tablets    100ta  Take 1            bs  Tablet By    Angelo,



   -          Mouth    FNP



             Every Day    



                 

 

 Calcium    Hx  Tablets  600-400mg  60tab  Take 1    Jacquelyn



 Carbonate-Vit      -Unit  s  Tablet By    Angelo,



 n D  -          Mouth    FNP



             Daily    



             With    



             Breakfast    



             And Take    



             1 Tablet    



             By Mouth    



             With    



             Dinner    

 

 Calcium 600-D    Hx  Tablets  600-400mg  60tab  1 with    Allan HOLT



         -Unit  s  breakfast    Breiman,



   -          and 1    M.D.



   01/03          with    



   /2016          dinner    

 

 Note No Work  07/15  Hx        Kati was              seen by    Angelo,



   -          me today    FNP



             and may    



   /2014          go back    



             to work    



             on [employer],    



             \    

 

 Product Code  07/10  Hx    Medium  180un  dx:    Jacquelyn



 BR      Brief  its  788.23 Pt    Angelo,



   -          uses 6/d    FN



                 

 

 Zoloft  07/10  Hx  Tablets  25mg  30tab  Take 1            s  Tablet By    Angelo,



   -          Mouth One    FNP



   04/02          Time    



   /2016          Daily    

 

 Disposable    Hx    Size  300Mo  Use as    Jacquelyn



 Underp      Large  nth  needed    Angelo,



   -          for    FNP



             incontin          nce dx:    



             788.37    

 

 Benzonatate    Hx  Capsules  200mg  60cap  1 po tid            s  prn for    Angelo,



   -          cough    FNP



                 

 

 Oxybutynin    Hx  Tablets ER  5mg  30tab  1 by    Allan HOLT



 Chloride ER      24HR    s  mouth    Breiman,



   -          every day    M.D.



                 

 

 Depends    Hx      2Case  medium  788.30          s  pull-up    Angelo,



   -          for    FNP



   07/12          incontin          nce waist    



             36" hip    



             40"    



             height    



             61"    



             weight    



             146 lbs -    



             pt uses    



             6/d    

 

 Bed Pads    Hx      1PK  use on  788.30            bed    Angelo,



   -          nightly    FNP



             prn    



                 

 

 Cipro    Hx  Tablets  500mg  14tab  1 po bid  599.0          s  for 7    Angelo,



   -          days    FNP



                 

 

 Ibuprofen    Hx  Tablets  400mg  90tab  1 tab by            s  mouth    Angelo,



   -          three    FNP



             times a    



             day    

 

 Ibuprofen    Hx  Tablets  200mg  100ta  2 po  719.47          bs  twice    Angelo,



   -          daily    FNP



             with bk          and lunch    



             on work    



             days- use    



             prn order    



             if    



             additiona    



             l doses    



             needed    

 

 Systane Ultra    Hx  Solution  Drops  1unit  Instill 1            s  Drop Both    Angelo,



   -          Eyes qid    FNP



                 

 

 Refresh P.M.    Hx  Ointment    1Tube  Apply To              Both    Angelo,



   -          Eyelids    FNP



             AT    



             Bedtime    

 

 Benzonatate    Hx  Capsules  100mg  50cap  2 Caps            s  tid prn    Angelo,



   -          Cough    FNP



   02/10              



   /2014              

 

 Diphenhydramine    Hx  Capsules  25mg  30cap  1-2 po    Jacquelyn



 HCL  /2012        s  q8hrs prn    Angelo,



   -          allergic    FNP



             reaction    



                 

 

 Acetaminophen    Hx  Tablets  325mg  100ta  2 po  q4            bs  hrs prn    Angelo,



   -          fever    FNP



             >100 or    



   /2014          pain    

 

 Robitussin-DM    Hx  Syrup  100-10mg/  1Bott  10ml po    Allan J.



         5ML  le  q4hrs prn    Jennifer,



   -          cough    M.D.



   02/10              



   /2014              

 

 Nystatin    Hx  Cream  419078Use  30uni  Apply    Allan J.



         t/GM  ts  Under    Kikiimalatricia,



   -          Breast    M.D.



             And    



             Abdomin    



             Area    



             Folds as    



             Needed    



             (Fungal    



             Rash)    

 

 Pyridium    Hx  Tablets  200mg  12tab  1 po tid            s  x 4d    Angelo,



   -              FNP



                 

 

 Ciprofloxacin    Hx  Tablets  250mg  14tab  1 po bid  599.0  Jacquelyn



 HCL  /2012        s  x 7d    Angelo,



   -              FNP



                 

 

 Mycolog-II    Hx  Cream    30gm  apply  599.0            bid-tid    Angelo,



   -          to    FNP



             affected    



             area    

 

 Synthroid    Hx  Tablets  25mcg  30tab  1 po qd    Jacquelyn        s      Angelo,



   -              FNP



   01/10              



   /2012              

 

 Bacitracin    Hx  Ointment  500Unit/G  30gm  apply to          M    small    Angelo,



   -          cuts/marvel    FNP



             sions as    



   /2017          needed    

 

 Allergy Cream    Hx  Cream  2%  45GR  apply to              bites    Angelo,



   -          four    FNP



   12/05          times    



   /2017          daily as    



             needed    

 

 Tessalon Perles    Hx  Capsules  100mg  30cap  one to  465.9  Levin A.



           s  two yobany Gaitan M.D.



   -          po tid    



             prn cough    



                 

 

 Xanax    Hx  Tablets  0.5mg  2tabs  1 by    Allan HOLT



             mouth    Breiman,



   -          prior to    M.D.



             exam, may    



   /2019          repeat    



             once    

 

 Nasal  06/15  Hx  Tablets  30mg  30tab  2 po q 4    Jacquelyn



 Decongestant          s  hrs prn    Angelo,



   -          congestio    FNP



                 

 

 Benadryl    Hx    25mg  30uni  1-2 po q            ts  8hrs prn    Jarrod,



   -          rash    Afnp-C



                 

 

 Zoloft    Hx  Tablets  50mg  30tab  Take 1  300.00  Jacquelyn        s  Tablet By    Angelo,



   -          Mouth One    FNP



             Time    



             Daily    

 

 Zoloft    Hx  Tablets  25mg  30tab  1 po qd  300.00  Jacquelyn



           s      Angelo,



   -              FNP



                 

 

 Elastic Ankle    Hx      2unit  use  782.3  Jacquelyn



 Brace          s  during    Angelo,



   -          the day    FNP



             for two    



   /2009          or until    



             the ankle    



             edema    



             resolves    

 

 Calcium-Vitamin    Hx  Tablets  600/400  60tab  1 With    



 D          s  Breakfast    Angelo,



   -          And 1    FNP



             With    



             Dinner    



             qd    

 

 Calcium 600 + D    Hx  Tablets  600-400mg  60tab  1 with          -Unit  s  breakfast    Angelo,



   -          and 1    FNP



             with    



             dinner    

 

 Hearing Eval    Hx        PT Needs    Allan HOLT



             To Have A    Jennifer



   -          Hearing    M.D.



             Evaluatio    



             n For    



             Hearing    



             Loss And    



             Hearing    



             Aides If    



             Needed    

 

 Wrist Splint    Hx        RT wrist  343.9  Allan HOLT



             splint to    Jennifer,



   -          prevent    MERNESTO



             es    

 

 Calcium+Vit.D    Hx    600mg  30uni  1 PO qd            ts      Angelo,



   -              FNP



                 

 

 Physical Therapy  08/10  Hx        treatment    Allan HOLT



             and    Jennifer,



   -          evaluatisailaja GRAY



             n for    



             cerebral    



             palsy    



             for    



             aquathera    



             py    

 

 Braces    Hx        R Arm    Allan HOLT



             Brace    Annette Rodriguez M.D.



   08/10              



   /2006              

 

 Zithromax    Hx  Capsules  250mg  6caps  2 Tabs PO    Thu



   /2006          X1, Then    [place],



   -          1 Tab PO    Afnp-C



             qd X           More Days    

 

 Hydrocortisone    Hx    2.5%  30gm  tid prn    Jacquelyn



 Cream  /2005          to    Angelo,



   -          affected    FNP



   12/05          areas    



   /2017              

 

 Note    Hx        PT Needs    Allan HOLT



   /          Evaluatio    Jennifer,



   -          n From    M.DEthan



             Orthotics    



             For New    



             Right Arm    



             Brace    

 

 Physical Therapy    Hx        treatment    Allan HOLT



             and    Jennifer



   -          evaluatisailaja GRAY



   10/28          n           right    



             arm/hand    



             x 4-6    



             wks.    



                 



                 



             attn:    



             Ewa    

 

 Xanax    Hx  Tablets  0.25mg  2tabs  1 prior    Jacquelyn          to exam    Angelo,



   -              FNP



                 

 

 Shoes    Hx        needs a    Allan HOLT



   /          hard sole    Jennifer,



   -          shoe due    M.DAPHNE



             to    



             fracture    

 

 Ibuprofen    Hx    400mg  100un  1 po    Allan HOLT



   /        its  q4hrs prn    Jennifer,



   -          menstral    M.DAPHNE



             cramps/          eletal    



             muscle    



             pain. do    



             not use    



             tylenol/i    



             buprofen    



             concurren    



             tly    

 

 Tylenol #3    Hx      30uni  1 or 2 q    Jacquelyn



   /        ts  6h prn    Angelo,



   -          pain    FNP



                 

 

 Walker With    Hx      1unit  as    Jacquelyn



 Wheel        s  directed.    Angelo,



   -              FNP



                 



             dx: r    



             foot fx    

 

 Return To Work  02/10  Hx        kati has    Jacquelyn          a r foot    Angelo,



   -          fracture    FNP



             and           not    



             return to    



             work    



             until    



             cleared    



             by the    



             orthopedi    



             st    

 

 Tamoxifen    Hx    10mg  100un  1 po bid  V10.3  Family



   /2004        its      Medicine



   -              [facility]



                 

 

 Calcium    Hx    500mg  500un  2 po q hs    Family



   /2004        its      Medicine



   -              [facility]



                 

 

 Depends    Hx    Large  2Case  pullups  788.30  Allan HOLT



 Undergarm        s  use as    Jennifer,



   -          directed    M.DAPHNE



                  PT    



             uses 2    



             cases    



             every    



             month    

 

 Handicap Sticker    Hx        Permanent    Allan HOLT



             Handicape    Jennifer,



   -          Childhood    M.D.



                 

 

 Vitamin C    Hx  Tablets  500mg  30tab  take 1    Allan HOLT



           s  tablet by    Jennifer,



   -          mouth one    M.DAPHNE



   08/17          time    



   /2016          daily    

 

 Ditropan XL    Hx    10mg  0unit  One PO qd    Family



   /2001        s      Medicine



   -              [facility]               

 

 Duratuss GP    Hx      6unit  1 PO qam            s  prn Head    Angelo,



   -          Congestio    FNP



                 

 

 Detrol LA    Hx    1mg  0unit  1 bid prn    Family



   /2001        s  For    Medicine



   -          Bladder    Associ[facility]



             Symptoms    Of               

 

 Orthodic    Hx        custom    Allan HOLT



             right    Jennifer,



   -          hand/wris    M.DEthan



             orthotic    

 

 Multivitamin  10/20  Hx      100un  1 qd            its      Angelo,



   -              FNP



                 

 

 Tums    Hx        1 po qd    Family



   /2000              Medicine



   -              [facility]              

 

 Handicapped    Hx        Requires    Allan HOLT



 Parking            Handicapp    Jennifer



   -          ed    M.DEthan



             Parking    



                 



             For 6    



             Months    



             Due To    



             Broken    



             Leg    

 

 Prinivil    Hx    5mg  30uni  1 PO Q0D    Allan HOLT



   /        zuleika Rodriguez



   -              M.DEthan



   10/20              



   /2000              

 

 Accupril    Hx    5mg  30uni  1 qd    Allan HOLT



   /        Annette Russo              M.DEthan



                 

 

 Dilantin    Hx    100mg  105un  1 po qod  780.39  Allan HOLT



   /        its  alternati    Jennifer,



   -          ng with 2    M.D.



             caps qod    



             and 2    



             caps qpm    

 

 Dilantin    Hx    100mg  60uni  1-2 PO qd    Allan YANET



   /              Annette Rodriguez              M.DEthan



                 

 

 Dilantin    Hx  Capsules  100mg    1 PO qd  780.39  Unknown



   /0000              



   -              



                 

 

 Dilantin    Hx  Capsules  100mg  105ca  3 caps po  780.39  Allan J.



   /0000        ps  qod alt    Jennifer,



   -          with 4    M.D.



   03/28          caps    



   /2012              

 

 Acetaminophen    Hx  Tablets  325mg    2 by    Unknown



   /0000          mouth    



   -          every 8    



   /24          hours as    



   /2019          needed    

 

 Biscolax    Hx  Suppository  10mg    1 per    Unknown



   /0000          rectum    



   -          every day    



             as needed    



             constipat    



             ion    

 

 Milk Of Magnesia    Hx  Suspension  7.75%    30ml by    Unknown



   /0000          mouth    



   -          every day    



             as needed    



   2019          constipat    



             ion if no    



             bm in 3    



             days    

 

 Mupirocin    Hx  Ointment  2%    apply    Unknown



   /0000          topically    



   -          three    



             times           day to    



             affected    



             area(s)    

 

 Guaifenesin    Hx  Liquid  100mg/5ML    10ml by    Unknown



   /0000          mouth    



   -          four    



             times           day as    



             needed    



             cough    







Immunizations







 CPT Code  Status  Date  Vaccine  Lot #

 

 [id number]  Given  2018  Influenza Vac, Quadrivalent, Slit Virus, Im  

 

   Given  10/25/2014  Split Influenza Medicare: Fluzone  [id number]

 

 [id number]  Given  10/15/2012  DO Not Use Split Influenza Virus Vaccine  

 

 42217  Given  2010  Tdap Tetanus, W Pertussis  [id number]







Vital Signs







 Date  Vital  Result  Comment

 

 2019 10:17am  BP Systolic  130 mmHg  









 BP Diastolic  70 mmHg  

 

 Heart Rate  80 /min  

 

 Body Temperature  97.3 F  

 

 Respiratory Rate  20 /min  

 

 Weight  146.00 lb  









 12/10/2018 11:30am  BP Systolic  132 mmHg  









 BP Diastolic  90 mmHg  

 

 Heart Rate  72 /min  

 

 Body Temperature  97.6 F  

 

 Respiratory Rate  16 /min  

 

 Height  58.25 inches  4'10.25"

 

 Weight  147.12 lb  

 

 BMI (Body Mass Index)  30.5 kg/m2  









 2018  8:57am  BP Systolic  170 mmHg  









 BP Diastolic  100 mmHg  

 

 Heart Rate  70 /min  

 

 Body Temperature  97.9 F  

 

 Height  58.5 inches  4'10.50"

 

 Weight  139.00 lb  

 

 BMI (Body Mass Index)  28.6 kg/m2  









 2017  1:14pm  BP Systolic  142 mmHg  









 BP Diastolic  84 mmHg  

 

 Heart Rate  76 /min  

 

 Body Temperature  97.9 F  

 

 Respiratory Rate  12 /min  









 2017  6:01pm  BP Systolic  160 mmHg  









 BP Diastolic  100 mmHg  

 

 Heart Rate  86 /min  

 

 Body Temperature  97.9 F  

 

 Height  58.5 inches  4'10.50"

 

 Weight  147.38 lb  

 

 BMI (Body Mass Index)  30.3 kg/m2  









 2017  3:05pm  BP Systolic  128 mmHg  









 BP Diastolic  82 mmHg  

 

 Heart Rate  72 /min  

 

 Body Temperature  98.6 F  

 

 Respiratory Rate  18 /min  

 

 Height  58.5 inches  4'10.50"

 

 Weight  158.00 lb  

 

 BMI (Body Mass Index)  32.5 kg/m2  









 2016  3:43pm  BP Systolic  136 mmHg  









 BP Diastolic  80 mmHg  

 

 Heart Rate  68 /min  

 

 Body Temperature  97.9 F  

 

 Respiratory Rate  20 /min  

 

 Height  58.5 inches  4'10.50"

 

 Weight  163.50 lb  

 

 BMI (Body Mass Index)  33.6 kg/m2  









 2016  3:37pm  BP Systolic  126 mmHg  









 BP Diastolic  80 mmHg  

 

 Heart Rate  74 /min  

 

 Body Temperature  97.8 F  

 

 Respiratory Rate  18 /min  

 

 Height  58.5 inches  4'10.50"

 

 Weight  164.00 lb  

 

 BMI (Body Mass Index)  33.7 kg/m2  









 2016  2:17pm  BP Systolic  128 mmHg  









 BP Diastolic  80 mmHg  

 

 Heart Rate  68 /min  

 

 Body Temperature  98.7 F  

 

 Respiratory Rate  18 /min  

 

 Height  61 inches  5'1"

 

 Weight  158.00 lb  

 

 BMI (Body Mass Index)  29.9 kg/m2  









 2015 12:55pm  BP Systolic  126 mmHg  









 BP Diastolic  80 mmHg  

 

 Heart Rate  64 /min  

 

 Body Temperature  98.5 F  

 

 Respiratory Rate  20 /min  

 

 Height  61 inches  5'1"

 

 Weight  156.00 lb  

 

 BMI (Body Mass Index)  29.5 kg/m2  









 2015  3:46pm  BP Systolic  142 mmHg  









 BP Diastolic  102 mmHg  

 

 Heart Rate  72 /min  

 

 Body Temperature  98.4 F  

 

 Height  61 inches  5'1"

 

 Weight  157.00 lb  

 

 BMI (Body Mass Index)  29.7 kg/m2  









 07/10/2015  1:50pm  BP Systolic  124 mmHg  









 BP Diastolic  80 mmHg  

 

 Heart Rate  62 /min  

 

 Body Temperature  97.8 F  

 

 Respiratory Rate  16 /min  

 

 Height  61 inches  5'1"

 

 Weight  144.00 lb  

 

 BMI (Body Mass Index)  27.2 kg/m2  









 2015  3:35pm  BP Systolic  140 mmHg  









 BP Diastolic  80 mmHg  

 

 Heart Rate  64 /min  

 

 Body Temperature  98.3 F  

 

 Respiratory Rate  20 /min  

 

 Height  61 inches  5'1"

 

 Weight  143.00 lb  

 

 BMI (Body Mass Index)  27.0 kg/m2  









 2015  2:10pm  BP Systolic  140 mmHg  









 BP Diastolic  80 mmHg  

 

 Heart Rate  64 /min  

 

 Body Temperature  98.7 F  

 

 Respiratory Rate  20 /min  

 

 Height  61 inches  5'1"

 

 Weight  142.00 lb  

 

 BMI (Body Mass Index)  26.8 kg/m2  









 2015  2:18pm  BP Systolic  120 mmHg  









 BP Diastolic  84 mmHg  

 

 Heart Rate  76 /min  

 

 Body Temperature  98.9 F  

 

 Respiratory Rate  16 /min  

 

 Height  61 inches  5'1"

 

 Weight  144.00 lb  

 

 BMI (Body Mass Index)  27.2 kg/m2  









 2015 11:03am  BP Systolic  112 mmHg  









 BP Diastolic  72 mmHg  

 

 Heart Rate  111 /min  

 

 Body Temperature  99.4 F  

 

 O2 % BldC Oximetry  96 %  

 

 Height  61 inches  5'1"

 

 Weight  144.00 lb  

 

 BMI (Body Mass Index)  27.2 kg/m2  









 2014  2:28pm  BP Systolic  128 mmHg  









 BP Diastolic  82 mmHg  

 

 Heart Rate  86 /min  

 

 Body Temperature  98.5 F  

 

 Respiratory Rate  14 /min  

 

 Height  61 inches  5'1"

 

 Weight  161.50 lb  

 

 BMI (Body Mass Index)  30.5 kg/m2  









 2014  2:12pm  BP Systolic  120 mmHg  









 BP Diastolic  80 mmHg  

 

 Heart Rate  92 /min  

 

 Body Temperature  99.2 F  

 

 Respiratory Rate  16 /min  

 

 Height  61 inches  5'1"

 

 Weight  160.00 lb  

 

 BMI (Body Mass Index)  30.2 kg/m2  









 07/15/2014  4:58pm  BP Systolic  138 mmHg  









 BP Diastolic  90 mmHg  

 

 Heart Rate  68 /min  

 

 Body Temperature  98.2 F  

 

 Respiratory Rate  18 /min  

 

 Height  61 inches  5'1"

 

 Weight  157.00 lb  

 

 BMI (Body Mass Index)  29.7 kg/m2  









 2014 10:06am  BP Systolic  122 mmHg  









 BP Diastolic  80 mmHg  

 

 Heart Rate  68 /min  

 

 Body Temperature  97.6 F  

 

 Respiratory Rate  18 /min  

 

 Height  61 inches  5'1"

 

 Weight  154.00 lb  

 

 BMI (Body Mass Index)  29.1 kg/m2  









 2014  8:51am  BP Systolic  148 mmHg  









 BP Diastolic  96 mmHg  

 

 Heart Rate  68 /min  

 

 Body Temperature  98.4 F  

 

 Respiratory Rate  16 /min  

 

 Height  61 inches  5'1"

 

 Weight  146.25 lb  

 

 BMI (Body Mass Index)  27.6 kg/m2  









 2014  9:09am  BP Systolic  124 mmHg  









 BP Diastolic  88 mmHg  

 

 Heart Rate  68 /min  

 

 Body Temperature  98.5 F  

 

 Respiratory Rate  68 /min  

 

 Height  61 inches  5'1"

 

 Weight  147.00 lb  

 

 BMI (Body Mass Index)  27.8 kg/m2  









 2014  4:07pm  BP Systolic  124 mmHg  









 BP Diastolic  80 mmHg  

 

 Heart Rate  66 /min  

 

 Body Temperature  97.9 F  

 

 Respiratory Rate  18 /min  

 

 Height  61 inches  5'1"

 

 Weight  150.00 lb  

 

 BMI (Body Mass Index)  28.3 kg/m2  









 02/10/2014  3:36pm  BP Systolic  130 mmHg  









 BP Diastolic  80 mmHg  

 

 Heart Rate  64 /min  

 

 Body Temperature  97.7 F  

 

 Respiratory Rate  18 /min  

 

 Height  61 inches  5'1"

 

 Weight  147.00 lb  

 

 BMI (Body Mass Index)  27.8 kg/m2  









 2013 10:27am  BP Systolic  124 mmHg  









 BP Diastolic  90 mmHg  

 

 Heart Rate  68 /min  

 

 Body Temperature  97.6 F  

 

 Respiratory Rate  16 /min  

 

 Height  61 inches  5'1"

 

 Weight  132.00 lb  

 

 BMI (Body Mass Index)  24.9 kg/m2  









 2012 10:05am  BP Systolic  118 mmHg  









 BP Diastolic  72 mmHg  

 

 Heart Rate  72 /min  

 

 Respiratory Rate  14 /min  

 

 Height  60.25 inches  5'0.25"

 

 Weight  113.00 lb  

 

 BMI (Body Mass Index)  21.9 kg/m2  









 2012  3:38pm  BP Systolic  122 mmHg  









 BP Diastolic  80 mmHg  

 

 Heart Rate  62 /min  

 

 Body Temperature  97.7 F  

 

 Height  60.25 inches  5'0.25"

 

 Weight  110.00 lb  

 

 BMI (Body Mass Index)  21.3 kg/m2  









 2011  9:02am  BP Systolic  120 mmHg  









 BP Diastolic  80 mmHg  

 

 Heart Rate  68 /min  

 

 Height  60.25 inches  5'0.25"

 

 Weight  115.00 lb  

 

 BMI (Body Mass Index)  22.3 kg/m2  









 10/27/2011  9:19am  BP Systolic  124 mmHg  









 BP Diastolic  84 mmHg  

 

 Heart Rate  52 /min  

 

 Height  60.25 inches  5'0.25"

 

 Weight  111.00 lb  

 

 BMI (Body Mass Index)  21.5 kg/m2  









 2011 11:36am  BP Systolic  102 mmHg  









 BP Diastolic  70 mmHg  

 

 Heart Rate  56 /min  

 

 Height  60.25 inches  5'0.25"

 

 Weight  112.00 lb  

 

 BMI (Body Mass Index)  21.7 kg/m2  









 2011 12:02pm  BP Systolic  106 mmHg  









 BP Diastolic  82 mmHg  

 

 Heart Rate  64 /min  

 

 Body Temperature  98.7 F  

 

 Height  60.25 inches  5'0.25"

 

 Weight  120.00 lb  

 

 BMI (Body Mass Index)  23.2 kg/m2  









 2011 10:06am  BP Systolic  120 mmHg  









 BP Diastolic  84 mmHg  

 

 Heart Rate  68 /min  

 

 Height  60.25 inches  5'0.25"

 

 Weight  125.00 lb  

 

 BMI (Body Mass Index)  24.2 kg/m2  









 2010  3:30pm  BP Systolic  100 mmHg  









 BP Diastolic  70 mmHg  

 

 Heart Rate  72 /min  

 

 Body Temperature  98.8 F  

 

 Height  60.25 inches  5'0.25"

 

 Weight  130.00 lb  

 

 BMI (Body Mass Index)  25.2 kg/m2  









 2010  4:05pm  BP Systolic  120 mmHg  









 BP Diastolic  70 mmHg  

 

 Heart Rate  80 /min  

 

 Body Temperature  98.3 F  

 

 Weight  129.00 lb  









 2010  3:04pm  BP Systolic  130 mmHg  









 BP Diastolic  80 mmHg  

 

 Heart Rate  64 /min  

 

 Body Temperature  98.9 F  

 

 Respiratory Rate  28 /min  

 

 Weight  127.00 lb  









 2010  4:10pm  BP Systolic  122 mmHg  









 BP Diastolic  80 mmHg  

 

 Heart Rate  56 /min  

 

 Body Temperature  99.0 F  

 

 Respiratory Rate  36 /min  

 

 O2 % BldC Oximetry  98 %  

 

 Weight  127.00 lb  









 2010  2:03pm  BP Systolic  114 mmHg  









 BP Diastolic  70 mmHg  

 

 Heart Rate  72 /min  

 

 Body Temperature  99.3 F  

 

 Height  60.25 inches  5'0.25"

 

 Weight  129.00 lb  

 

 BMI (Body Mass Index)  25.0 kg/m2  









 2010  1:05pm  BP Systolic  112 mmHg  









 BP Diastolic  72 mmHg  

 

 Heart Rate  76 /min  

 

 Height  60.25 inches  5'0.25"

 

 Weight  128.00 lb  

 

 BMI (Body Mass Index)  24.8 kg/m2  









 2010 11:18am  BP Systolic  120 mmHg  









 BP Diastolic  80 mmHg  

 

 Heart Rate  76 /min  

 

 Body Temperature  98.3 F  

 

 Height  60.25 inches  5'0.25"

 

 Weight  136.00 lb  

 

 BMI (Body Mass Index)  26.3 kg/m2  









 2009 10:37am  BP Systolic  130 mmHg  









 BP Diastolic  100 mmHg  

 

 Heart Rate  78 /min  

 

 Body Temperature  99.1 F  

 

 Weight  136.00 lb  









 2009  1:54pm  BP Systolic  102 mmHg  









 BP Diastolic  60 mmHg  

 

 Heart Rate  76 /min  

 

 Body Temperature  99.4 F  

 

 Height  60.25 inches  5'0.25"

 

 Weight  140.00 lb  

 

 BMI (Body Mass Index)  27.1 kg/m2  









 2009  3:06pm  BP Systolic  120 mmHg  









 BP Diastolic  80 mmHg  

 

 Heart Rate  68 /min  

 

 Weight  138.00 lb  









 2009  4:38pm  BP Systolic  120 mmHg  









 BP Diastolic  80 mmHg  

 

 Heart Rate  80 /min  

 

 Body Temperature  98.3 F  

 

 Weight  138.00 lb  









 2009  2:41pm  BP Systolic  110 mmHg  









 BP Diastolic  80 mmHg  

 

 Heart Rate  72 /min  

 

 Body Temperature  98.8 F  

 

 Height  60.25 inches  5'0.25"

 

 Weight  138.00 lb  

 

 BMI (Body Mass Index)  26.7 kg/m2  









 2008  2:26pm  BP Systolic  110 mmHg  









 BP Diastolic  68 mmHg  

 

 Heart Rate  76 /min  

 

 Body Temperature  98.5 F  

 

 Height  60.75 inches  5'0.75"

 

 Weight  139.00 lb  

 

 BMI (Body Mass Index)  26.5 kg/m2  









 2007  2:14pm  BP Systolic  112 mmHg  









 BP Diastolic  72 mmHg  

 

 Heart Rate  88 /min  

 

 Height  61 inches  5'1"

 

 Weight  128.00 lb  

 

 BMI (Body Mass Index)  24.2 kg/m2  









 2007  2:32pm  BP Systolic  100 mmHg  









 BP Diastolic  60 mmHg  

 

 Heart Rate  72 /min  

 

 Height  61 inches  5'1"

 

 Weight  127.00 lb  

 

 BMI (Body Mass Index)  24.0 kg/m2  









 08/10/2006  9:21am  BP Systolic  120 mmHg  









 BP Diastolic  70 mmHg  

 

 Heart Rate  68 /min  

 

 Height  61 inches  5'1"

 

 Weight  116.00 lb  

 

 BMI (Body Mass Index)  21.9 kg/m2  









 2006  9:34am  BP Systolic  110 mmHg  









 BP Diastolic  76 mmHg  

 

 Heart Rate  64 /min  

 

 Height  61 inches  5'1"

 

 Weight  117.00 lb  

 

 BMI (Body Mass Index)  22.1 kg/m2  









 2006  2:54pm  BP Systolic  110 mmHg  









 BP Diastolic  70 mmHg  

 

 Heart Rate  60 /min  

 

 Height  61 inches  5'1"

 

 Weight  117.00 lb  

 

 BMI (Body Mass Index)  22.1 kg/m2  









 2006  4:21pm  BP Systolic  100 mmHg  









 BP Diastolic  70 mmHg  

 

 Body Temperature  99.2 F  

 

 Height  61 inches  5'1"

 

 Weight  118.00 lb  

 

 BMI (Body Mass Index)  22.3 kg/m2  









 2005  3:04pm  BP Systolic  120 mmHg  









 BP Diastolic  78 mmHg  

 

 Heart Rate  78 /min  

 

 Body Temperature  97.6 F  

 

 Respiratory Rate  11 /min  

 

 Height  61 inches  5'1"

 

 Weight  150.00 lb  

 

 BMI (Body Mass Index)  28.3 kg/m2  









 2005 11:07am  BP Systolic  104 mmHg  









 BP Diastolic  70 mmHg  

 

 Heart Rate  72 /min  

 

 Body Temperature  98.7 F  

 

 Height  61 inches  5'1"

 

 Weight  130.00 lb  

 

 BMI (Body Mass Index)  24.6 kg/m2  









 02/10/2005  3:14pm  BP Systolic  112 mmHg  









 BP Diastolic  70 mmHg  

 

 Height  61 inches  5'1"

 

 Weight  136.00 lb  

 

 BMI (Body Mass Index)  25.7 kg/m2  









 2004  7:38pm  BP Systolic  106 mmHg  









 BP Diastolic  58 mmHg  

 

 Heart Rate  68 /min  

 

 Height  61 inches  5'1"

 

 Weight  141.00 lb  

 

 BMI (Body Mass Index)  26.6 kg/m2  









 2004  6:35pm  BP Systolic  118 mmHg  









 BP Diastolic  78 mmHg  

 

 Heart Rate  74 /min  

 

 Height  61 inches  5'1"

 

 Weight  140.00 lb  

 

 BMI (Body Mass Index)  26.4 kg/m2  









 05/10/2004  7:29pm  Height  61 inches  5'1"









 Weight  140.00 lb  

 

 BMI (Body Mass Index)  26.4 kg/m2  









 2003  3:29pm  BP Systolic  100 mmHg  









 BP Diastolic  76 mmHg  

 

 Heart Rate  76 /min  

 

 Body Temperature  98.5 F  

 

 Height  61 inches  5'1"

 

 Weight  140.00 lb  

 

 BMI (Body Mass Index)  26.4 kg/m2  









 2003 11:40am  BP Systolic  110 mmHg  









 BP Diastolic  80 mmHg  

 

 Heart Rate  64 /min  

 

 Height  61.5 inches  5'1.50"

 

 Weight  141.00 lb  

 

 BMI (Body Mass Index)  26.6 kg/m2  









 2002  3:39pm  BP Systolic  102 mmHg  









 BP Diastolic  70 mmHg  

 

 Heart Rate  68 /min  

 

 Body Temperature  98.6 F  

 

 Height  61.5 inches  5'1.50"

 

 Weight  141.00 lb  

 

 BMI (Body Mass Index)  26.6 kg/m2  









 2001 10:32am  BP Systolic  104 mmHg  









 BP Diastolic  60 mmHg  

 

 Heart Rate  60 /min  

 

 Body Temperature  98.9 F  

 

 Height  61.5 inches  5'1.50"

 

 Weight  138.00 lb  

 

 BMI (Body Mass Index)  26.1 kg/m2  









 2001 10:16am  BP Systolic  110 mmHg  









 BP Diastolic  80 mmHg  

 

 Heart Rate  72 /min  

 

 Height  61.5 inches  5'1.50"

 

 Weight  139.00 lb  

 

 BMI (Body Mass Index)  26.3 kg/m2  









 2001 11:38am  Body Temperature  98.9 F  









 Height  61.5 inches  5'1.50"

 

 Weight  136.00 lb  

 

 BMI (Body Mass Index)  25.7 kg/m2  









 2001 10:32am  BP Systolic  94 mmHg  









 BP Diastolic  60 mmHg  

 

 Heart Rate  84 /min  

 

 Body Temperature  98.6 F  

 

 Height  61.5 inches  5'1.50"

 

 Weight  136.00 lb  

 

 BMI (Body Mass Index)  25.7 kg/m2  









 10/20/2000  9:05am  BP Systolic  100 mmHg  









 BP Diastolic  62 mmHg  

 

 Height  62 inches  5'2"

 

 Weight  143.00 lb  

 

 BMI (Body Mass Index)  26.2 kg/m2  









 2000  9:08am  BP Systolic  102 mmHg  









 BP Diastolic  66 mmHg  

 

 Height  62 inches  5'2"

 

 Weight  157.00 lb  

 

 BMI (Body Mass Index)  28.7 kg/m2  









 2000  8:58am  BP Systolic  110 mmHg  LG Cuff









 BP Diastolic  60 mmHg  LG Cuff

 

 Height  62 inches  5'2"

 

 Weight  174.00 lb  

 

 BMI (Body Mass Index)  31.8 kg/m2  







Results







 Test  Date  Facility  Test  Result  H/L  Range  Note

 

 Laboratory test  12/10/2018  [facility] ([facility])  TSH  0.78 mIU/L    0.50-
6.00  



 finding              









 Free T4  1.24 ng/dL    0.75-1.54  









 Comprehensive Metabolic  12/10/2018  [facility] ([facility])  Sodium  134 mEq/L  
  134-149  



 Prof              









 Potassium  3.9 mEq/L    3.6-5.5  

 

 Chloride  102 mEq/L      

 

 Carbon Dioxide  30 mEq/L    21-32  

 

 Glucose  76 mg/dL      

 

 BUN  19 mg/dL    6-26  

 

 Creatinine  0.6 mg/dL    0.6-1.4  

 

 BUN/Creat Ratio  31.7 CALC    8.0-36.0  

 

 Calcium  9.2 mg/dL    8.6-10.2  

 

 Total Protein  8.1 g/dL    6.4-8.3  

 

 Albumin  4.6 g/dL    3.8-5.5  

 

 Globulin  3.5 g/dL    2.0-4.8  

 

 A/G Ratio  1.3 CALC    0.6-2.3  

 

 Alk. Phosphatase  119 U/L  High    1

 

 Alt (SGPT)  37 U/L  High  7-35  

 

 Ast (Sgot)  27 U/L    5-34  

 

 Total Bilirubin  0.2 mg/dL    0.2-1.3  

 

 GFR Non-African American  >60 ml/min/1.73m^    >=60  

 

 GFR African American  >60 ml/min/1.73m^    >=60  









 Laboratory test  12/10/2018  [facility] Menezes ([facility])  Cholesterol  232 mg/dL  
High  120-200  



 finding              

 

 CBC Electronic [facility]  12/10/2018  Cl[facility] Menezes ([facility])  WBC  6.6 x10^3/UL    4.0-
10.0  









 RBC  4.98 x10^6/UL    3.93-6.00  

 

 HGB  15.9 g/dL    12.0-17.0  

 

 HCT  49 %    35-50  

 

 MCV  98.0 fL  High  80.0-95.0  

 

 MCH  31.9 pg    25.6-32.2  

 

 MCHC  32.6 g/dL    32.2-36.0  

 

 RDW-CV  12.5 %    11.6-14.4  

 

 PLT  248 x10^3/UL    163-400  

 

 MPV  10.2 fL    9.4-12.4  

 

 Corey#  4.33 x10^3/UL    1.56-6.13  

 

 Lymph#  1.61 x10^3/UL    1.18-3.74  

 

 Mono#  0.53 x10^3/UL    0.24-0.82  

 

 Eos #  0.1 x10^3/UL    0.0-0.5  

 

 Baso #  0.03 x10^3/UL    0.01-0.08  

 

 Corey%  65.5 %    34.0-70.0  

 

 Lymph %  24.4 %    20.0-52.0  

 

 Mono%  8.0 %    5.0-12.0  

 

 Eos%  1.4 %    0.7-7.0  

 

 Baso%  0.5 %    0.1-1.2  









 Laboratory  12/10/2018  [facility]  Phenytoin  23.3 ug/mL  High  10.0-20.0  2, 3



 test finding    [address] [place] [address]  (Dilantin)[place] [address]  Serum        



     (603)-   -          

 

 Laboratory  2018  [facility] ([facility])  Magnesium,  1.7 mEq/L    1.2-2.1
  4



 test finding      Serum        

 

 Laboratory  2018  [facility]  Surgical  SEE RESULT      5



 test finding      Interface Order  BELOW      

 

 Iron And Tibc  2017  [facility]  Iron  447 g/dL    250-450  6



     [address] [place] [address]  [employer]Tibc)        



     [place] [address]          



     (606)-   -          









 Uibc  435 g/dL  High  131-425  

 

 Iron, Serum  12 g/dL  Low    

 

 Iron Saturation  3 %  Low  15-55  









 Laboratory test finding  2017  [facility] ([facility])  TSH  1.39 mIU/L    
0.50-6.00  









 Free T4  0.90 ng/dL    0.75-1.54  









 Laboratory test  2017  [facility]  Phenytoin  19.9 ug/mL    10.0-20.0  7



 finding    [address] [place] [address]  (Dilantin), Serum        



     [place] [address]          



     (604)-   -          

 

 CBC Electronic  2017  [facility]  WBC  6.5    3.6-9.6  



 ([facility])    (607)-   -          









 RBC  5.00    3.90-5.70  

 

 Hemoglobin ([facility])  9.0 g/dL  Low  12.1 - 17.2  

 

 Hematocrit ([facility])  30.3 %  Low  36.1 - 50.3  

 

 Platelets  375 10^3/ul    150-400  

 

 Lymph%  52.4 %  High  17.0-48.0  

 

 Mixed%  5.6      

 

 Neutrophils %  42.0      

 

 Mean Corpuscular Vol  61  Low  82.2-97.4  

 

 Mean Corpuscular Hemoglobin  17.9  Low  27.6-33.3  

 

 Mean Corpuscular Hemo Concen  29.6  Low  32.0-36.0  

 

 RDW  19.8  High  11.6-13.7  

 

 Mean Platelet Volume  9.7    5.5-11.0  









 Comprehensive Metabolic  2017  [facility] (a)  Sodium  138 mEq/L  
  134-149  



 Prof              









 Potassium  4.0 mEq/L    3.6-5.5  

 

 Chloride  100 mEq/L      

 

 Carbon Dioxide  26 mEq/L    21-32  

 

 Glucose  90 mg/dL      

 

 BUN  17 mg/dL    6-26  

 

 Creatinine  0.5 mg/dL  Low  0.6-1.4  8

 

 BUN/Creat Ratio  34.0 CALC    8.0-36.0  

 

 Calcium  9.5 mg/dL    8.6-10.2  

 

 Total Protein  8.3 g/dL    6.4-8.3  

 

 Albumin  4.5 g/dL    3.8-5.5  

 

 Globulin  3.8 g/dL    2.0-4.8  

 

 A/G Ratio  1.2 CALC    0.6-2.3  

 

 Alk. Phosphatase  120 U/L  High    9

 

 Alt (SGPT)  21 U/L    7-35  

 

 Ast (Sgot)  23 U/L    5-34  

 

 Total Bilirubin  0.2 mg/dL    0.2-1.3  

 

 GFR Non-African American  >60 ml/min/1.73m^    >=60  

 

 GFR African American  >60 ml/min/1.73m^    >=60  









 [place]  [facility] [place]      10



 finding      Pathology  BELOW      

 

 [place]  [facility] [place]      11, 12



 finding      Interface Order  BELOW      

 

 Laboratory test  2016  [facility] ([facility])  TSH  1.10 mIU/L    0.50-6  



 finding            .00  









 Free T4  0.94 ng/dL    0.75-1.54  









 Comprehensive Metabolic  2016  [facility] ([facility])  Sodium  137 mEq/L  
  134-149  



 Prof              









 Potassium  3.7 mEq/L    3.6-5.5  

 

 Chloride  99 mEq/L      

 

 Carbon Dioxide  23 mEq/L    21-32  

 

 Glucose  85 mg/dL      

 

 BUN  17 mg/dL    6-26  

 

 Creatinine  0.6 mg/dL    0.6-1.4  

 

 BUN/Creat Ratio  28.3 CALC    8.0-36.0  

 

 Calcium  8.9 mg/dL    8.6-10.2  

 

 Total Protein  7.8 g/dL    6.4-8.3  

 

 Albumin  3.9 g/dL    3.8-5.5  

 

 Globulin  3.9 g/dL    2.0-4.8  

 

 A/G Ratio  1.0 CALC    0.6-2.3  

 

 Alk. Phosphatase  144 U/L  High    13

 

 Alt (SGPT)  23 U/L    7-35  

 

 Ast (Sgot)  33 U/L    5-34  

 

 Total Bilirubin  0.2 mg/dL    0.2-1.3  

 

 GFR Non-African American  >60 ml/min/1.73m^    >=60  

 

 GFR African American  >60 ml/min/1.73m^    >=60  









 Laboratory test  2016  [facility]  Phenytoin  15.3    10.0-20.0  14, 15



 finding    [address] [facility]  (Dilantin),  ug/mL      



     [place] [address]  Serum        



     (607)-   -          

 

 CBC Electronic  2016  [facility]  WBC  6.9    3.6-9.6  



 (Fma)    (607)-   -          









 RBC  4.44    3.90-5.70  

 

 Hemoglobin (Fma/CMC/CTX)  8.9 g/dL  Low  12.1 - 17.2  

 

 Hematocrit ([facility]ma/[facility]/CTX)  28.9 %  Low  36.1 - 50.3  

 

 Platelets  332 10^3/ul    150-400  

 

 Lymph%  31.3 %    17.0-48.0  

 

 Mixed%  5.8      

 

 Neutrophils %  62.9      

 

 Mean Corpuscular Vol  65  Low  82.2-97.4  

 

 Mean Corpuscular Hemoglobin  20.0  Low  27.6-33.3  

 

 Mean Corpuscular Hemo Concen  30.7  Low  32.0-36.0  

 

 RDW  18.7  High  11.6-13.7  

 

 Mean Platelet Volume  8.5    5.5-11.0  









 [place]  [facility] [place]      16



 finding      Pathology  BELOW      

 

 CBC Electronic  2015  [facility]  WBC  7.9    3.6-9.6  



 (Fma)    (607)-   -          









 RBC  4.57    3.90-5.70  

 

 Hemoglobin ([facility]/CTX)  9.0 g/dL  Low  12.1 - 17.2  

 

 Hematocrit ([facility]ma/[facility]/CTX)  30.2 %  Low  36.1 - 50.3  

 

 Platelets  404 10^3/ul  High  150-400  

 

 Lymph%  24.8 %    17.0-48.0  

 

 Mixed%  4.5      

 

 Neutrophils %  70.7      

 

 Mean Corpuscular Vol  66  Low  82.2-97.4  

 

 Mean Corpuscular Hemoglobin  19.7  Low  27.6-33.3  

 

 Mean Corpuscular Hemo Concen  29.7  Low  32.0-36.0  

 

 RDW  24.5  High  11.6-13.7  

 

 Mean Platelet Volume  6.9    5.5-11.0  









 Laboratory test  2015  [facility]  Phenytoin  12.0 ug/ml    10.0-20.0  17



 finding    [address] [facility]  ([facility])        



     [place] [address] [phone]          

 

 [place]abolic  [place]  [facility] [place]  Sodium  135 mmol/L  N  133-145  



 Panel              









 Potassium  3.6 mmol/L  N  3.5-5.0  

 

 Chloride  100 mmol/L  Low  101-111  

 

 Co2 Carbon Dioxide  27 mmol/L  N  22-32  

 

 Anion Gap  8 mmol/L  N  2-11  

 

 Glucose  93 mg/dL  N    

 

 Blood Urea Nitrogen  12 mg/dL  N  6-24  

 

 Creatinine  0.62 mg/dL  N  0.51-0.95  

 

 BUN/Creatinine Ratio  19.4  N  8-20  

 

 Calcium  8.9 mg/dL  N  8.6-10.3  

 

 Total Protein  7.6 g/dL  N  6.4-8.9  

 

 Albumin  3.9 g/dL  N  3.2-5.2  

 

 Globulin  3.7 g/dL  N  2-4  

 

 Albumin/Globulin Ratio  1.1  N  1-3  

 

 Total Bilirubin  0.30 mg/dL  N  0.2-1.0  

 

 Alkaline Phosphatase  113 U/L  High    

 

 Alt  14 U/L  N  7-52  

 

 Ast  20 U/L  N  13-39  

 

 Egfr Non-  101.9  N  >60  

 

 Egfr   131.0  N  >60  18









 [place]  [facility] [place]ase  25 U/L  N  11.0-82.0  



 finding              

 

 [facility]  [facility] [place]  1.08  High  0.78-1.07  

 

 [place] test  [place]  [facility] [place]  Activated Partial  25.1 seconds  Low  26.0-
36.3  



 finding      Thrombo Time        

 

 [place]  [place]  [facility] [place] Occult Blood  (SEE NOTE)      19

 

 [place]  [facility] [place]  Microcytosis  2+  N    20









 Hypochromasia  3+  N    

 

 Elliptocyte  1+  N    









 [place]  [place]  [facility] [place]  White Blood Count  8.6 10^3/uL  N  4.8-10.8  









 Red Blood Count  3.48 10^6/uL  Low  4.0-5.4  

 

 Hemoglobin  5.9 g/dL  Low  12.0-16.0  21

 

 Hematocrit  20 %  Low  35-47  

 

 Mean Corpuscular Volume  56 fL  Low  80-97  

 

 Mean Corpuscular Hemoglobin  17 pg  Low  27-31  

 

 Mean Corpuscular HGB Conc  30 g/dL  Low  31-36  

 

 Red Cell Distribution Width  21 %  High  10.5-15  

 

 Platelet Count  337 10^3/uL  N  150-450  

 

 Mean Platelet Volume  9 um3  N  7.4-10.4  

 

 Abs Neutrophils  6.6 10^3/uL  N  1.5-7.7  

 

 Abs Lymphocytes  1.4 10^3/uL  N  1.0-4.8  

 

 Abs Monocytes  0.6 10^3/uL  N  0-0.8  

 

 Abs Eosinophils  0.1 10^3/uL  N  0-0.6  

 

 Abs Basophils  0.1 10^3/uL  N  0-0.2  

 

 Abs Nucleated RBC  0 10^3/uL  N    

 

 Granulocyte %  76.2 %  N  38-83  

 

 Lymphocyte %  15.7 %  Low  25-47  

 

 Monocyte %  6.7 %  N  1-9  

 

 Eosinophil %  0.8 %  N  0-6  

 

 Basophil %  0.6 %  N  0-2  

 

 Nucleated Red Blood Cells %  0  N    









 Influenza A&B-fma  2015  [facility]  Influenza A  NEGATIVE      



     (607)-   -          









 Influenza B  NEGATIVE      









 Laboratory test  2014  [facility] ([facility])  TSH  1.07 mIU/L    0.50-
6.00  



 finding              

 

 Ua - Micro ([facility])  2014  [facility]  Appearance  yellow      



     (607)-   -          









 Color  clear      

 

 Glucose  neg      

 

 Bilirubin  neg      

 

 Ketones  neg      

 

 SP Grav  >1.030      

 

 Blood  mod  #    

 

 PH  5.0      

 

 Protein  ssa trace  #    

 

 Urobil  0.2      

 

 Nitrite  pos  #    

 

 Leukocytes ([facility])  -      

 

 Hyaline  - /Lpf      

 

 Granular  2-3 /Lpf  #    

 

 WBC ([facility])  10-15  #    

 

 RBC  -      

 

 Mucus  occ /Lpf  #    

 

 Epith  +3 /Lpf  #    

 

 Bacteria  - /Hpf      

 

 Amorphous  - /Lpf      

 

 Crystals, Fluid ([facility])  -      

 

 Z#Comments  -      









 Laboratory test  2014  [facility]  Phenytoin  8.5 ug/ml  Low  10.0-20.0  22



 finding    [address] [facility]  ([place] [address] [phone]          









 Urine Culture  Escherichia coli      23









 Comprehensive Metabolic  2014  [facility] (a)  Sodium  140 mEq/L  
  134-149  



 Prof              









 Potassium  4.1 mEq/L    3.6-5.5  

 

 Chloride  97 mEq/L      

 

 Carbon Dioxide  27 mEq/L    21-32  

 

 Glucose  85 mg/dL      

 

 BUN  23 mg/dL    6-26  

 

 Creatinine  0.7 mg/dL    0.6-1.4  

 

 BUN/Creat Ratio  32.9 CALC    8.0-36.0  

 

 Calcium  9.7 mg/dL    8.6-10.2  

 

 Total Protein  7.3 g/dL    6.3-8.1  

 

 Albumin  4.0 g/dL    3.8-5.5  

 

 Globulin  3.3 g/dL    2.0-4.8  

 

 A/G Ratio  1.2 CALC    0.6-2.3  

 

 Alk. Phosphatase  104 U/L      

 

 Alt (SGPT)  22 U/L    7-35  

 

 Ast (Sgot)  24 U/L    5-34  

 

 Total Bilirubin  0.3 mg/dL    0.2-1.3  









 Laboratory test finding  2014  [facility] (a)  TSH  1.33 mIU/L    
0.50-6.00  









 Free T4  0.96 ng/dL    0.75-1.54  

 

 Free T3  3.38 pg/mL    2.00-4.90  









 Lipid Profile  2014  [facility] (a)  Cholesterol  252 mg/dL  High  
120-200  









 Triglycerides  86 mg/dL      

 

 HDL Cholesterol  69 mg/dL    30-85  

 

 LDL (Calculated)  166 CALC  High  0-129  

 

 VLDL Cholesterol  17 mg/dL    0-50  

 

 HDL Risk Factor  3.7 CALC    0.0-4.4  









 Complete Blood Count  2014  [facility] (a)  WBC  5.3 x10^3/UL    
3.6-9.6  









 RBC  4.01 x10^6/UL    3.90-5.70  

 

 HGB  12.7 g/dL    12.1-17.2  

 

 HCT  37 %    36-50  

 

 MCV  93.0 fL    82.2-97.4  

 

 MCH  32.1 pg    27.6-33.3  

 

 MCHC  34.3 g/dL    33.0-35.5  

 

 RDW  11.4 %  Low  11.6-13.7  

 

 PLT  343 x10^3/UL    150-400  

 

 MPV  7.3 fL  Low  7.4-10.4  

 

 Gran #  3.5 x10^3/UL    1.5-7.2  

 

 Lymph#  1.4 x10^3/UL    0.7-4.9  

 

 Mono#  0.4 x10^3/UL    0.1-0.9  

 

 Gran %  65.4 %    42.2-75.2  

 

 Lymph %  27.0 %    20.5-51.1  

 

 Mono%  7.6 %    1.7-9.3  









 Comprehensive Metabolic  2013  [facility] ([facility])  Albumin  4.5 g/dL  
  3.8-5.5  



 Prof              









 Alk. Phos.  110 U/L      

 

 Alt (SGPT)  25 U/L    7-35  

 

 Ast (Sgot)  26 U/L    5-34  

 

 BUN  24 mg/dL    6-26  

 

 Calcium  9.6 mg/dL    8.6-10.2  

 

 Chloride  95 mEq/L      

 

 Creatinine  0.6 mg/dL    0.6-1.4  

 

 Carbon Dioxide  29 mEq/L    21-32  

 

 Glucose  79 mg/dL      

 

 Sodium  140 mEq/L    134-149  

 

 Total Bilirubin  0.2 mg/dL    0.2-1.3  

 

 Total Protein  7.2 g/dL    6.3-8.1  

 

 Potassium  3.9 mEq/L    3.6-5.5  

 

 Globulin  2.7 g/dL    2.0-4.8  

 

 A/G Ratio  1.6 Calc    0.6-2.3  

 

 BUN/Creat Ratio  37.4 Calc  High  8.0-36.0  









 Laboratory test  2013  [facility] ([facility])  Free T4  0.94 ng/dL    0.75-
1.54  



 finding              









 TSH  1.20 mIU/L    0.50-6.00  









 CBC Electronic (a)  2013  [facility]  WBC  4.7    3.6-9.6  



     (607)-   -          









 RBC  4.26    3.90-5.70  

 

 Hemoglobin ([facility])  13.8 g/dL    12.1 - 17.2  

 

 Hematocrit ([facility]/CTX)  42.7 %    36.1 - 50.3  

 

 Platelets  231 10^3/ul    150-400  

 

 Lymph%  33.7    20.5-51.1  

 

 Mixed%  8.3      

 

 Neutrophils %  58.0      

 

 Mean Corpuscular Vol  100  High  82.2-97.4  24

 

 Mean Corpuscular Hemoglobin  32.5    27.6-33.3  

 

 Mean Corpuscular Hemo Concen  32.4    32.0-36.0  

 

 RDW  12.6    11.6-13.7  

 

 Mean Platelet Volume  7.5    6.5-11.0  









 Ua - Non Micro ([facility])  2013  [facility]  Appearance  CLEAR      



     (607)-   -          









 Color  YELLOW      

 

 Glucose, Urine ([facility]/CTX)  NEG      

 

 Bilirubin  NEG      

 

 Ketones  NEG      

 

 SP Grav  1.020      

 

 Blood  NEG      

 

 PH  6.5      

 

 Protein  NEG      

 

 Urobil  0.2      

 

 Nitrite  NEG      

 

 Leukocytes (a/[facility])  NEG      









 Laboratory test  2013  [facility]  Phenytoin  16.3 ug/ml    10.0-20.0  



 finding    [address] [facility]  ([facility])        



     [place] [address] [phone]          

 

 CBC With Manual  2012  [facility]  White Blood  5.0 CUMM    4.8-10.8  25



 Diff      Count        









 Red Cell Count  4.28 CUMM    4.2-5.4  

 

 Hemoglobin  14.1 g/dL    12.0-16.0  

 

 Hematocrit  42 %    35-47  

 

 Mean Corpuscular Volume  98 um3  High  79-97  

 

 Mean Corpuscular Hemoglob  33 pg  High  27-31  

 

 Mean Corpuscular HGB Cone  34 g/dL    32-36  

 

 Redcell Distribution WDTH  12 %    10.5-15  

 

 Platelet Count  168 CUMM    150-450  

 

 Mean Platelet Volume  9.0 um3    7.4-10.4  

 

 Absolute Neutrophil Count  2.9    1.5-7.7  

 

 Polysegmented Neutrophil  67 %    38-83  

 

 Lymphocyte  31 %    25-47  

 

 Monocyte  1 %    0-13  

 

 Eosinophil  1 %    0-6  

 

 RBC Morphology  NORMAL      









 Comp Metabolic Panel  2012  [facility]  Sodium  139 mmol/L    135-145  









 Potassium  4.5 mmol/L    3.5-5.0  

 

 Chloride  102 mmol/L    101-111  

 

 Co2 (Carbon Dioxide)  33.0 mmol/L  High  22-32  

 

 Anion Gap  4.0 mmol/L    2-11  26

 

 Glucose  78 mg/dL      

 

 BUN  19 mg/dL    6-24  

 

 Creatinine  0.6 mg/dL    0.50-1.40  

 

 One Over Creatinine  1.66      

 

 BUN/Creatinine Ratio  31.7  High  8-20  

 

 Calcium  9.3 mg/dL    8.1-9.9  

 

 Total Protein  6.7 GM/DL    6.2-8.1  

 

 Albumin  3.8 GM/DL    3.6-5.4  

 

 Globulin  2.9 GM/DL    2-4  

 

 Albumin/Globulin Ratio  1.3    1-3  

 

 Bilirubin Total  0.6 mg/dL    0.4-1.5  27

 

 Alkaline Phosphatase  91 U/L      

 

 Alt (SGPT)  28 U/L    14-54  

 

 Ast (Sgot)  27 U/L    12-42  

 

 eGFR Non-  106.7    > 60  

 

 eGFR   137.2    > 60  28









 Laboratory test finding  2012  [facility]  Phenytoin (Dilantin)  21.2 g/mL  
High  10-20  29









 TSH  1.00 MIU/ML    0.34-5.60  









 Ua - Micro ([facility])  2012  [facility]  Appearance  CLOUDY      



     (607)-   -          









 Color  YELLOW      

 

 Glucose  NEG      

 

 Bilirubin  NEG      

 

 Ketones  NEG      

 

 SP Grav  1.015      

 

 Blood  MODERATE  #    

 

 PH  6.0      

 

 Protein  NEG      

 

 Urobil  0.2      

 

 Nitrite  POSITIVE  #    

 

 Leukocytes ([facility])  SMALL  #    

 

 Hyaline  - /Lpf      

 

 Granular  - /Lpf      

 

 WBC ([facility])  30-40  #    

 

 RBC  10-15  #    

 

 Mucus  - /Lpf      

 

 Epith  OCC /Lpf  #    

 

 Bacteria  3+ /Hpf  #    

 

 Amorphous  - /Lpf      

 

 Crystals, Fluid ([facility])  -      

 

 Z#Comments  -      









 Laboratory test finding  2011  [facility] ([facility])  TSH  1.36 mIU/L    
0.50-6.00  









 Free T4  0.72 ng/dL  Low  0.75-1.54  30

 

 Free T3  3.34 pg/mL    2.00-4.90  









 Comprehensive Metabolic  2011  [facility] ([facility])  Albumin  4.5 g/dL  
  3.8-5.5  



 Prof              









 Alk. Phos.  78 U/L      

 

 Alt (SGPT)  21 U/L    7-35  

 

 Ast (Sgot)  22 U/L    5-34  

 

 BUN  18 mg/dL    6-26  

 

 Calcium  9.8 mg/dL    8.6-10.2  

 

 Chloride  97 mEq/L      

 

 Creatinine  0.6 mg/dL    0.6-1.4  

 

 Carbon Dioxide  29 mEq/L    21-32  

 

 Glucose  94 mg/dL      

 

 Sodium  140 mEq/L    134-149  

 

 Total Bilirubin  0.2 mg/dL    0.2-1.3  

 

 Total Protein  7.3 g/dL    6.3-8.1  

 

 Potassium  4.4 mEq/L    3.6-5.5  

 

 Globulin  2.8 g/dL    2.0-4.8  

 

 A/G Ratio  1.6 Calc    0.6-2.2  

 

 BUN/Creat Ratio  28.4 Calc    8.0-36.0  









 Laboratory test  2011  [facility] ([facility])  Free T3  3.06 pg/mL    2.00-
4.90  



 finding              









 Free T4  0.73 ng/dL  Low  0.75-1.54  31

 

 TSH  1.65 mIU/L    0.50-6.00  









 CBC Electronic ([facility])  2011  [facility]  WBC  4.3    3.6-9.6  



     (607)-   -          









 RBC  4.39    3.90-5.70  

 

 Hemoglobin ([facility])  14.3 g/dL    12.1 - 17.2  

 

 Hematocrit ([facility])  42.2 %    36.1 - 50.3  

 

 Platelets  218 10^3/ul    150-400  

 

 Lymph%  32.6    20.5-51.1  

 

 Mixed%  6.9      

 

 Neutrophils %  60.5      

 

 Mean Corpuscular Vol  96    82.2-97.4  

 

 Mean Corpuscular Hemoglobin  32.6    27.6-33.3  

 

 Mean Corpuscular Hemo Concen  33.9    32.0-36.0  

 

 RDW  11.1  Low  11.6-13.7  

 

 Mean Platelet Volume  8.3    6.5-11.0  









 Laboratory test  2011  [facility]  Phenytoin  12.9 ug/ml    10.0-20.0  32



 finding    [address] [facility]  (Dilantin)        



     [place] [address] [phone]          

 

 CBC Electronic  2011  [facility]  WBC  4.6    3.6-9.6  



 ([facility]ma)    (607)-   -          









 RBC  4.34    3.90-5.70  

 

 Hemoglobin ([facility]ma/[facility])  14.5 g/dL    12.1 - 17.2  

 

 Hematocrit ([facility])  42.6 %    36.1 - 50.3  

 

 Platelets  211 10^3/ul    150-400  

 

 Lymph%  25.6    20.5-51.1  

 

 Mixed%  3.1      

 

 Neutrophils %  71.3      

 

 Mean Corpuscular Vol  98  High  82.2-97.4  

 

 Mean Corpuscular Hemoglobin  33.5  High  27.6-33.3  

 

 Mean Corpuscular Hemo Concen  34.2    32.0-36.0  

 

 RDW  11.2  Low  11.6-13.7  

 

 Mean Platelet Volume  7.7    6.5-11.0  









 Ua - Micro ([facility])  2011  [facility]  Appearance  CLEAR      



     (607)-   -          









 Color  YELLOW      

 

 Glucose  -      

 

 Bilirubin  -      

 

 Ketones  -      

 

 SP Grav  1.010      

 

 Blood  TRACE  #    

 

 PH  7.0      

 

 Protein  -      

 

 Urobil  0.2      

 

 Nitrite  -      

 

 Leukocytes ([facility])  -      

 

 Hyaline  - /Lpf      

 

 Granular  - /Lpf      

 

 WBC ([facility])  0-1  #    

 

 RBC  0-2  #    

 

 Mucus  - /Lpf      

 

 Epith  OCC /Lpf  #    

 

 Bacteria  TRACE /Hpf  #    

 

 Amorphous  - /Lpf      

 

 Crystals, Fluid ([facility]ma/[facility])  -      

 

 Z#Comments  -      









 Laboratory test  2011  [facility]  Thin Prep  SEE      33



 finding    [address] [facility]  W/HPV(Lsil/ALEA/Asc)  NOTE      



     [place] [address] [phone]          

 

 Comprehensive  2011  [facility] (a)  Albumin  4.1    3.8  



 Metabolic Prof        g/dL    -5.  



             5  









 Alk. Phos.  69 U/L      

 

 Alt (SGPT)  20 U/L    7-35  

 

 Ast (Sgot)  23 U/L    5-34  

 

 BUN  13 mg/dL    6-26  

 

 Calcium  8.8 mg/dL    8.6-10.2  

 

 Chloride  96 mEq/L      

 

 Creatinine  0.7 mg/dL    0.6-1.4  

 

 Carbon Dioxide  25 mEq/L    21-32  

 

 Glucose  88 mg/dL      

 

 Sodium  142 mEq/L    134-149  

 

 Total Bilirubin  0.2 mg/dL    0.2-1.3  

 

 Total Protein  7.0 g/dL    6.3-8.1  

 

 Potassium  4.1 mEq/L    3.6-5.5  

 

 Globulin  2.9 g/dL    2.0-4.8  

 

 A/G Ratio  1.4 Calc    0.6-2.2  

 

 BUN/Creat Ratio  19.1 Calc    8.0-36.0  









 Laboratory test  2011  [facility]  Phenytoin  9.1 ug/ml  Low  10.0-20.0  34



 finding    [address] [facility] [employer]  ([facility])        



     [place] [address] [phone]          

 

 CBC (a)  2010  [facility]  WBC  6.7    3.6-9.6  



     (607)-   -          









 RBC  4.47    3.90-5.70  

 

 Hemoglobin ([facility]ma/[facility])  15.1 g/dL    12.1 - 17.2  

 

 Hematocrit ([facility]ma/[facility]/CTX)  42.4 %    36.1 - 50.3  

 

 Mean Corpuscular Vol  94.9    82.2-97.4  

 

 Mean Corpuscular Hemaglobin  33.8  High  27.6-33.3  

 

 Mean Corpuscular Hemo Concen  35.6    33.0-36.0  

 

 Platelets  222 10^3/ul    150-400  

 

 Lymph%  32.4    20.5-51.1  

 

 Mixed%  8.2      

 

 Neutrophils %  59.4      

 

 RDW  12.5    11.6-13.7  

 

 Mean Platelet Volume  10.9  High  7.4-10.4  









 Laboratory test  2010  [facility]  Phenytoin  5.4 ug/ml  Low  10.0-20.0  35



 finding    [address] [facility] [employer]  ([facility])        



     [place] [address] [phone]          

 

 Laboratory test  2010  [facility] ([facility])  Free T4  1.06    0.75-1.54  



 finding        ng/dL      









 TSH  1.84 mIU/L    0.50-6.00  

 

 Free T3  2.96 pg/mL    2.00-4.90  

 

 B12  411 pg/mL    230-1050  









 Comprehensive Metabolic  2010  [facility] (a)  Albumin  4.3 g/dL  
  3.8-5.5  



 Prof              









 Alk. Phos.  59 U/L      

 

 Alt (SGPT)  22 U/L    7-35  

 

 Ast (Sgot)  20 U/L    5-34  

 

 BUN  15 mg/dL    6-26  

 

 Calcium  9.3 mg/dL    8.6-10.2  

 

 Chloride  100 mEq/L      

 

 Creatinine  0.6 mg/dL    0.6-1.4  

 

 Carbon Dioxide  24 mEq/L    21-32  

 

 Glucose  138 mg/dL  High    36

 

 Sodium  138 mEq/L    134-149  

 

 Total Bilirubin  0.2 mg/dL    0.2-1.3  

 

 Total Protein  7.3 g/dL    6.3-8.1  

 

 Potassium  3.8 mEq/L    3.6-5.5  

 

 Globulin  3.0 g/dL    2.0-4.8  

 

 A/G Ratio  1.4 Calc    0.6-2.2  

 

 BUN/Creat Ratio  22.8 Calc    8.0-36.0  









 Ua - Non Micro ([facility])  2010  [facility]  Appearance  CLEAR      



     (607)-   -          









 Color  YELLOW      

 

 Glucose  NEG      

 

 Bilirubin  NEG      

 

 Ketones  NEG      

 

 SP Grav  1.010      

 

 Blood  NEG      

 

 PH  7.0      

 

 Protein  NEG      

 

 Urobil  0.2      

 

 Nitrite  NEG      

 

 Leukocytes ([facility])  NEG      









 [place]  [place]  [facility] [place]  White Blood Count  4.2 CUMM  Low  
4.8-10.8  37









 Red Cell Count  4.28 CUMM    4.2-5.4  

 

 Hemoglobin  14.4 g/dL    12.0-16.0  

 

 Hematocrit  41 %    35-47  

 

 Mean Corpuscular Volume  95 um3    79-97  

 

 Mean Corpuscular Hemoglob  34 pg  High  27-31  

 

 Mean Corpuscular HGB Cone  35 g/dL    32-36  

 

 Redcell Distribution WDTH  12 %    10.5-15  

 

 Platelet Count  222 CUMM    150-450  

 

 Mean Platelet Volume  8.3 um3    7.4-10.4  

 

 Gran %  57.0 %    38-83  

 

 Lymph %  29.4 %    25-47  

 

 Mononuclear %  7.8 %    1-9  

 

 Eosinophil %  4.8 %    0-6  

 

 Basophil %  1.0 %    0-2  

 

 Abs Lymphs  1.2    1.0-4.8  

 

 Abs Mononuclear  0.3    0-0.8  

 

 Absolute Neutrophil Count  2.4    1.5-7.7  

 

 Abs Eosinophils  0.2    0-0.6  

 

 Abs Basophils  0    0-0.2  









 [place]  [place]  [facility] [place]odium  140 mmol/L    135-145  









 Potassium  3.9 mmol/L    3.5-5.0  

 

 Chloride  104 mmol/L    101-111  

 

 Co2 (Carbon Dioxide)  31.0 mmol/L    22-32  

 

 Anion Gap  5.0 mmol/L    2-11  38

 

 Glucose  76 mg/dL      39

 

 BUN  9 mg/dL    6-24  

 

 Creatinine  0.70 mg/dL    0.50-1.40  

 

 One Over Creatinine  1.40      

 

 BUN/Creatinine Ratio  12.9    8-20  

 

 Calcium  9.1 mg/dL    8.1-9.9  40

 

 Total Protein  6.6 GM/DL    6.2-8.1  

 

 Albumin  3.8 GM/DL    3.6-5.4  

 

 Globulin  2.8 GM/DL    2-4  

 

 Albumin/Globulin Ratio  1.4    1-3  

 

 Bilirubin Total  0.4 mg/dL    0.4-1.5  41

 

 Alkaline Phosphatase  78 U/L      

 

 Alt (SGPT)  27 U/L    14-54  

 

 Ast (Sgot)  25 U/L    12-42  









 [place]  [place]  [facility] [place]hen[place]in (D[place]in[place]  4.3 g/mL  
Low  10-20  42

 

 [place] Metabolic [place]  [place]  [facility] [place]  Sodium  139 mmol/L    135-145  









 Potassium  4.4 mmol/L    3.5-5.0  

 

 Chloride  106 mmol/L    101-111  

 

 Co2 (Carbon Dioxide)  31.0 mmol/L    22-32  

 

 Anion Gap  2.0 mmol/L    2-11  43

 

 Glucose  84 mg/dL      

 

 BUN  12 mg/dL    6-24  

 

 Creatinine  0.8 mg/dL    0.5-1.4  

 

 One Over Creatinine  1.25      

 

 BUN/Creatinine Ratio  15.0    8-20  

 

 Calcium  8.8 mg/dL    8.1-9.9  44

 

 Total Protein  6.8 GM/DL    6.2-8.1  

 

 Albumin  3.4 GM/DL  Low  3.6-5.4  

 

 Globulin  3.4 GM/DL    2-4  

 

 Albumin/Globulin Ratio  1.0    1-3  

 

 Bilirubin Total  0.2 mg/dL  Low  0.4-1.5  

 

 Alkaline Phosphatase  96 U/L      

 

 Alt (SGPT)  27 U/L    14-54  

 

 Ast (Sgot)  25 U/L    12-42  









 [place]  [place]  [facility] [place]hen[place]in ([place]in[place]  5.1 g/mL  Low  10-20
  45



 finding              

 

 [place] With [place] Diff  [place]  [facility] [place]  White Blood Count  5.4 CUMM    4.8-10.8
  









 Red Cell Count  4.17 CUMM  Low  4.2-5.4  

 

 Hemoglobin  13.6 g/dL    12.0-16.0  

 

 Hematocrit  39 %    35-47  

 

 Mean Corpuscular Volume  94 um3    79-97  

 

 Mean Corpuscular Hemoglob  33 pg  High  27-31  

 

 Mean Corpuscular HGB Cone  35 g/dL    32-36  

 

 Redcell Distribution WDTH  13 %    10.5-15  

 

 Platelet Count  216 CUMM    150-450  

 

 Mean Platelet Volume  8.6 um3    7.4-10.4  

 

 Polysegmented Neutrophil  81 %    38-83  

 

 Lymphocyte  13 %    5-47  

 

 Monocyte  5 %    0-13  

 

 Eosenophil  1 %    0-6  

 

 Absolute Neutrophil Count  4.3      

 

 RBC Morphology  NORMAL      









 [place] Diff  [place]  [facility] [place]  [place] Blood Count  4.1 CUMM  Low  
4.8-10.8  









 Abs Basophils  0    0-0.2  

 

 Abs Eosinophils  0.1    0-0.6  

 

 Absolute Neutrophil Count  2.3    1.5-7.7  

 

 Abs Lymphs  1.3    1.0-4.8  

 

 Abs Mononuclear  0.4    0-0.8  

 

 Basophil %  0.2 %    0-2  

 

 Hematocrit  39 %    35-47  

 

 Hemoglobin  13.2 g/dL    12.0-16.0  

 

 Eosinophil %  2.4 %    0-6  

 

 Gran %  54.9 %    38-83  

 

 Lymph %  32.0 %    20-45  

 

 Mean Corpuscular HGB Cone  34 g/dL    32-36  

 

 Mean Corpuscular Hemoglob  33 pg  High  27-31  

 

 Mean Corpuscular Volume  96 um3    79-97  

 

 Mean Platelet Volume  8.9 um3    7.4-10.4  

 

 Mononuclear %  10.5 %  High  1-9  

 

 Platelet Count  182 CUMM    150-450  

 

 Red Cell Count  4.06 CUMM  Low  4.2-5.4  

 

 Redcell Distribution WDTH  12 %    10.5-15  









 Laboratory test finding  2007  [facility]  Free Thyroxine  0.69 NG/ML    0.61-
1.24  46

 

 [place]  [facility] [place] Creat[place]  1.25      









 Anion Gap  5.0 mmol/L    2-11  47

 

 Albumin/Globulin Ratio  1.1    1-3  

 

 Albumin  3.1 GM/DL  Low  3.6-5.4  

 

 Alkaline Phosphatase  62 U/L      

 

 Alt (SGPT)  15 U/L    14-54  

 

 Ast (Sgot)  18 U/L    12-42  

 

 BUN  10 mg/dL    6-24  

 

 Calcium  8.6 mg/dL  Low  8.7-10.2  

 

 Chloride  106 mmol/L    101-111  

 

 Co2 (Carbon Dioxide)  29.0 mmol/L    22-32  

 

 Globulin  2.9 GM/DL    2-4  

 

 Glucose  84 mg/dL      

 

 Potassium  4.0 mmol/L    3.5-5.0  

 

 Sodium  140 mmol/L    135-145  

 

 Bilirubin Total  0.5 mg/dL    0.4-1.5  

 

 Total Protein  6.0 GM/DL  Low  6.2-8.1  

 

 BUN/Creatinine Ratio  12.5    8-20  

 

 Creatinine  0.8 mg/dL    0.5-1.4  









 Laboratory test finding  2007  [facility]  Phenytoin (Dilantin)  4.8 g/mL  
Low  10-20  48









 TSH  1.68 MIU/ML    0.34-5.60  









 Laboratory test  2006  [facility]  Thinprep Pap  SEE IMAGE      



 finding    [address]  [address] [place]        



     [place] [address]  ALEA/ASCUS        



     [phone]          

 

 Laboratory test  2006  [facility]  Phenytoin  10.1 ug/ml    10.0-2  49



 finding    [address]  (Dilantin)      0.0  



     [place], [place] [id number] [phone]          

 

 Comp Metabolic  2006  [facility]  Glucose, Serum  89 mg/dL    70-
105  



 ([facility]) Female    (607)-   -  ([facility]/CTX)        









 BUN ([facility])  9 mg/dL    6-26  

 

 Creatinine, Serum  0.7 mg/dL    0.6-1.4  

 

 BUN/Creatinin Ratio  12.1    8.0-36  

 

 Sodium  141    134-149  

 

 Potassium  4.0    3.6-5.5  

 

 Chloride  96 mEq/L      

 

 Co2  31    21-32  

 

 Calcium ([facility])  9.0 mg/dL    8.6-10.2  

 

 Total Protein  7.9 g/dL    6.3-8.1  

 

 Albumin ([facility])  4.1    3.8-5.5  

 

 Globulin  3.8    2.0-4.8  

 

 A/G Ratio (A/G Ratio)  1.1    0.6-2.2  

 

 Alkaline Phosphatase (F/C/CTX)  78 U/L      

 

 Alt (SGPT) Female ([facility])  19    7-35  

 

 Ast Sgot  21 U/L    5-34  

 

 Bilirubin, Total  0.2 mg/dL    0.2-1.3  









 Laboratory test  2006  [facility]  T[place] [place]  2.12 uIU/
ml    0.5-6.0  



 finding    (607)-   -          









 Free T4 (a/[facility])  1.08 ng/dL    0.75-1.54  









 CBC Electronic ([facility])  2006  [facility]  WBC  7.8    3.6-9.6  



     (607)-   -          









 Lymphocytes  45.4 %    20.5 - 51.1  

 

 Monocytes  4.9 %    1.7-9.3  

 

 Granulocytes  49.7 %    42.2 - 75.2  

 

 Lymphocytes  2.2 10^3/uL    0.7 - 4.9  

 

 Monocytes  0.2 10^3/uL    0.1 - 0.9  

 

 Granulocytes  2.4 10^3/uL    1.5 - 7.2  

 

 RBC  4.19    3.90-5.70  

 

 Hemoglobin (a/[facility]/CTX)  13.5 g/dL    12.1 - 17.2  

 

 Hematocrit (a/[facility]/CTX)  39.5 %    36.1 - 50.3  

 

 Mean Corpuscular Vol  94.3    82.2-97.4  

 

 Mean Corpuscular Hemaglobin  32.3    27.6-33.3  

 

 Mean Corpuscular Hemo Concen  34.2    33.0-36.0  

 

 RDW  12.0    11.6-13.7  

 

 Platelets  184. 10^3/ul    150-400  

 

 Mean Platelet Volume  8.4    7.4-10.4  









 Laboratory test  2005  [facility]  CMP;PHENYTOIN;CBC    See Image  



 finding            Report  

 

 Laboratory test  2005  [facility]  Thinprep  SEE IMAGE      



 finding    [address]  [address]        



     [place] [address]  (ALEA/ASCUS)        



     [phone]          

 

 [place]  [facility] [place]  Sodium  140 mmol/L    135-145  



 ([facility])              









 Potassium  4.2 mmol/L    3.5-5.0  

 

 Chloride  105 mmol/L    101-111  

 

 Co2  29.0 mmol/L    22-32  

 

 Anion Gap  6.0 mmol/L    2-11  

 

 Glucose, Serum (a/[facility]/CTX)  87 mg/dL      

 

 BUN (a/[facility])  11 mg/dL    6-24  

 

 Creatinine (a/[facility])  0.7 mg/dL    0.5-1.4  

 

 BUN/Creatinin Ratio  15.7    8-20  

 

 Calcium (a/[facility])  9.2 mg/dL    8.7-10.2  

 

 Total Protein  6.8 GM/DL    6.2-8.1  

 

 Albumin ([facility])  3.5  Low  3.6-5.4  

 

 Globulin  3.3    2-4  

 

 A/G Ratio (a/[facility])  1.1    1-3  

 

 Bilirubin, Total  0.5 mg/dL    0.4-1.5  

 

 Alkaline Phosphatase (F/C/CTX)  58 U/L      

 

 Alt (SGPT) (a/[facility])  21    14-54  

 

 Ast (Sgot) (a/[facility])  20    12-42  









 [place]  [place]  [facility]  7.6  Low  10-20  

 

 CBC [place]  [place]  [facility]  WBC  3.8 CUMM  Low  4.8-10.8  









 RBC  4.26 CUMM    4.2-5.4  

 

 Hemoglobin (a/[facility]/CTX)  14.0 g/dL    12.0-16.0  

 

 Hematocrit (a/CMC/CTX)  41 %    35-47  

 

 Mean Corpuscular Vol  96 UM3    79-97  

 

 Mean Corpuscular Hemaglobin  33 pg  High  27-31  

 

 Mean Corpuscular Hemo Concen  34 g/dL    32-36  

 

 RDW  12    10.5-15  

 

 Platelets  200 CUMM    150-450  

 

 Mean Platelet Volume  9.0    7.4-10.4  

 

 Granulocytes  58.4 %    38-83  

 

 Lymphocytes  32.3 %    20-45  

 

 Monocytes  6.7 %    1-9  

 

 Eosinophil  1.8    0-6  

 

 Basophil%  0.8    0-2  

 

 Abs Lymphs  1.2    1.0-4.8  

 

 Abs Mononuclear  0.3    0-0.8  

 

 Abs Grans  2.2    1.5-7.7  

 

 Abs Eosinophils  0.1    0-0.6  

 

 Abs Basophils  0    0-0.2  









 Comp Metabolic [place]  [place]  [facility]  Sodium  141 mmol/L    135-145  









 Potassium  4.2 mmol/L    3.5-5.0  

 

 Chloride  108 mmol/L    101-111  

 

 Co2  28.0 mmol/L    22-32  

 

 Anion Gap  5.0 mmol/L    2-11  

 

 Glucose, Serum (a/CMC/CTX)  83 mg/dL      

 

 BUN (a/[facility])  12 mg/dL    6-24  

 

 Creatinine (a/CMC/CTX)  0.6 mg/dL    0.5-1.4  

 

 BUN/Creatinin Ratio  20.0    8-20  

 

 Calcium (a/[facility])  9.1 mg/dL    8.7-10.2  

 

 Total Protein  6.7 GM/DL    6.2-8.1  

 

 Albumin ([facility])  3.6    3.6-5.4  

 

 Globulin  3.1    2-4  

 

 A/G Ratio (a/[facility])  1.2    1-3  

 

 Bilirubin, Total  0.4 mg/dL    0.4-1.5  

 

 Alkaline Phosphatase (F/C/CTX)  66 U/L      

 

 Alt (SGPT) (a/[facility])  23    14-54  

 

 Ast (Sgot) (a/[facility])  25    12-42  









 Laboratory test finding  2004  [facility]  Phenytoin  6.7  Low  10-20  

 

 CBC Electronic [place]  [place]  [facility]  WBC  4.7 CUMM  Low  4.8-10.8  









 RBC  4.35 CUMM    4.2-5.4  

 

 Hemoglobin (a/CMC/CTX)  14.4 g/dL    12.0-16.0  

 

 Hematocrit (a/CMC/CTX)  42 %    35-47  

 

 Mean Corpuscular Vol  97 UM3    79-97  

 

 Mean Corpuscular Hemaglobin  33 pg  High  27-31  

 

 Mean Corpuscular Hemo Concen  34 g/dL    32-36  

 

 RDW  12    10.5-15  

 

 Platelets  202 CUMM    150-450  

 

 Mean Platelet Volume  8.7    7.4-10.4  

 

 Granulocytes  66.8 %    38-83  

 

 Lymphocytes  23.9 %    20-45  

 

 Monocytes  6.7 %    1-9  

 

 Eosinophil  2.0    0-6  

 

 Basophil%  0.6    0-2  

 

 Abs Lymphs  1.1    1.0-4.8  

 

 Abs Mononuclear  0.3    0-0.8  

 

 Abs Grans  3.1    1.5-7.7  

 

 Abs Eosinophils  0.1    0-0.6  

 

 Abs Basophils  0    0-0.2  









 Ua - Non Micro ([facility])  05/10/2004  Family Medicine  Color  YELLOW      



     (607)-   -          









 Glucose  NEG      

 

 Bilirubin  NEG      

 

 Ketones  NEG      

 

 SP Grav  1.0158      

 

 Blood  3+      

 

 PH  5.5      

 

 Protein  SSA NEG      

 

 Urobil  0.2      

 

 Nitrite  NEG      

 

 Leukocytes  NEG      









 Comp Metabolic [place]  [place]  [facility]  Sodium  138 mmol/L    135-145  









 Potassium  4.6 mmol/L    3.5-5.0  

 

 Chloride  107 mmol/L    101-111  

 

 Co2  29.0 mmol/L    22-32  

 

 Anion Gap  2.0 mmol/L    2-11  

 

 Glucose, Serum ([facility]/CTX)  95 mg/dL      

 

 BUN (a/[facility])  7 mg/dL    6-24  

 

 Creatinine (a/CMC/CTX)  0.7 mg/dL    0.5-1.4  

 

 BUN/Creatinin Ratio  10.0    8-20  

 

 Calcium (a/[facility])  8.9 mg/dL    8.7-10.2  

 

 Total Protein  6.6 GM/DL    6.2-8.1  

 

 Albumin (a/[facility])  3.3  Low  3.6-5.4  

 

 Globulin  3.3    2-4  

 

 A/G Ratio (a/[facility])  1.0    1-3  

 

 Bilirubin, Total  0.5 mg/dL    0.4-1.5  

 

 Alkaline Phosphatase (F/C/CTX)  64 U/L      

 

 Alt (SGPT) (a/[facility])  17    14-54  

 

 Ast (Sgot) (a/[facility])  21    12-42  









 Laboratory test [place]  [place]  [facility] [place]  [place]henytoin  6.7  Low  10-20  









 TSH (a/[facility])  2.14    0.34-5.60  









 CBC Electronic [place]  [place]  [facility]  WBC  3.7 CUMM  Low  4.8-10.8  









 RBC  4.07 CUMM  Low  4.2-5.4  

 

 Hemoglobin ([facility]/CTX)  13.7 g/dL    12.0-16.0  

 

 Hematocrit ([facility]/CTX)  39 %    35-47  

 

 Mean Corpuscular Vol  96 UM3    79-97  

 

 Mean Corpuscular Hemaglobin  34 pg  High  27-31  

 

 Mean Corpuscular Hemo Concen  35 g/dL    32-36  

 

 RDW  12    10.5-15  

 

 Platelets  200 CUMM    150-450  

 

 Mean Platelet Volume  8.2    7.4-10.4  

 

 Granulocytes  56.9 %    38-83  

 

 Lymphocytes  28.5 %    20-45  

 

 Monocytes  9.1 %  High  1-9  

 

 Eosinophil  4.1    0-6  

 

 Basophil%  1.4    0-2  

 

 Abs Lymphs  1.1    1.0-4.8  

 

 Abs Mononuclear  0.3    0-0.8  

 

 Abs Grans  2.0    1.5-7.7  

 

 Abs Eosinophils  0.2    0-0.6  

 

 Abs Basophils  0.1    0-0.2  









 Laboratory test  07[place]10/2003  [facility] [place]  Urine C&S  NO GROWTH    Final  



 finding      ([facility]  DAY 2      

 

 Ua - Non Micro  2003  Family Medicine  Appearance  CLEAR/LT      



 ([facility])    (607)-   -    YELLOW      









 Glucose  NEG      

 

 Bilirubin  NEG      

 

 Ketones  NEG      

 

 SP Grav  1.015      

 

 Blood  TRACE-LYSED      

 

 PH  8.0      

 

 Protein  NEG      

 

 Urobil  0.2      

 

 Nitrite  NEG      

 

 Leukocytes  NEG      









 Comp Metabolic [place]  [place]  [facility]  Sodium  137 mmol/L    135-145  









 Potassium  4.5    3.5-5.0  

 

 Chloride  105 mmol/L    101-111  

 

 Co2  29.0    22-32  

 

 Glucose  89 mg/dL      

 

 BUN  10    6-24  

 

 Creatinine  0.6 mg/dL    0.5-1.4  

 

 BUN/Creatinin Ratio  16.7    8-20  

 

 Calcium  8.7 mg/dL    8.7-10.2  

 

 Total Protein  6.2 GM/DL    6.2-8.1  

 

 Albumin (a/[facility])  3.1  Low  3.6-5.4  

 

 Globulin  3.1    2-4  

 

 A/G Ratio (a/[facility])  1.0    1-3  

 

 Bilirubin, Total  0.2 mg/dL  Low  0.4-1.5  

 

 Alkaline Phosphatase  62 U/L      

 

 Alt (SGPT)  17    14-54  

 

 Ast (Sgot)  18    12-42  









 Laboratory test finding  2002  [facility]  Phenytoin  3.5  Low  10-20  

 

 CBC W/ Manual Diff [place]  [place]  [facility]  WBC  6.0    4.8-10.8  









 RBC  4.14  Low  4.2-5.4  

 

 Hemoglobin  13.6 g/dL    12.0-16.0  

 

 Hematocrit  39 %    35-47  

 

 Mean Corpuscular Vol  94    79-97  

 

 Mean Corpuscular Hemaglobin  33  High  27-31  

 

 Mean Corpuscular Hemo Concen  35    32-36  

 

 RDW  11    10.5-15  

 

 Platelets  229 CUMM    150-450  

 

 Mean Platelet Volume  8.4    7.4-10.4  

 

 Poly From [facility]  67    38-83  

 

 Band  -    0-8  

 

 Lymph [place] [facility]  28    5-47  

 

 Monocytes  4 %    0-13  

 

 Eosinophils  -    0-6  

 

 Atypical Lymph  1    0-6  

 

 Morphology  NORMAL      

 

 Basophils  -      









 Comp Metabolic [place]  [place]  [facility]  Sodium  139 mmol/L    135-145  









 Potassium  4.5    3.5-5.0  

 

 Chloride  104 mmol/L      

 

 Co2  24.7    21-33  

 

 Glucose  101 mg/dL      

 

 BUN  16    6-22  

 

 Creatinine  0.6 mg/dL    0.5-1.4  

 

 BUN/Creatinin Ratio  26.7  High  8-20  

 

 Calcium  8.6 mg/dL  Low  8.7-10.2  

 

 Total Protein  7.1 GM/DL    6.2-8.1  

 

 Albumin  3.9    3.6-5.4  

 

 Globulin  3.2    2-4  

 

 Albumin / Globulin Ratio  1.2    1-3  

 

 Bilirubin, Total  0.3 mg/dL    0.1-1.0  

 

 Alkaline Phosphatase  59 U/L      

 

 Alt (SGPT)  30    1-40  

 

 Ast (Sgot)  23    1-34  









 Laboratory test finding  2002  [facility]  Dilantin (Phenytoin)  7.3 g/mL  
Low  10-20  

 

 CBC Electronic [place]  [place]  [facility]  WBC  6.6    4.8-10.8  









 RBC  4.27    4.2-5.4  

 

 Hemoglobin  14.1 g/dL    12.0-16.0  

 

 Hematocrit  40 %    35-47  

 

 Mean Corpuscular Vol  94    79-97  

 

 Mean Corpuscular Hemaglobin  33  High  27-31  

 

 Mean Corpuscular Hemo Concen  35    32-36  

 

 RDW  12    10.5-15  

 

 Platelets  207 CUMM    150-450  

 

 Mean Platelet Volume  9.0    7.4-10.4  

 

 Granulocytes  71.1 %    38-83  

 

 Lymphocytes  20.0 %    20-45  

 

 Monocytes  6.6 %    1-9  

 

 Eosinophil  1.6    0-6  

 

 Basophil%  0.7    0-2  

 

 Abs Lymphs  1.3    1.0-4.8  

 

 Abs Mononuclear  0.4    0-0.8  

 

 Abs Grans  4.8    1.5-7.7  

 

 Abs Eosinophils  0.1    0-0.6  

 

 Abs Basophils  0    0-0.2  









 Laboratory test  2002  [facility]  Phenytoin  7.8 ug/ml  Low  10 - 20  



 finding    [address] [facility]  (Dilantin)        



     [place] [address] [phone]          

 

 CBC With Diff  2002  [facility]  WBC  7.0    3.6-9.6  



 ([facility])    (607)-   -          









 Lymphocytes  25.1 %    20.5 - 51.1  

 

 Monocytes  2.9 %    1.7-9.3  

 

 Granulocytes  72.0 %    42.2 - 75.2  

 

 Lymphocytes  1.8 10^3/uL    0.7 - 4.9  

 

 Monocytes  0.2 10^3/uL    0.1 - 0.9  

 

 Granulocytes  5.0 10^3/uL    1.5 - 7.2  

 

 RBC  4.29    3.90-5.70  

 

 Hemoglobin  14.4 g/dL    12.1 - 17.2  

 

 Hematocrit  40.3 %    36.1 - 50.3  

 

 Mean Corpuscular Vol  93.8    82.2-97.4  

 

 Mean Corpuscular Hemaglobin  33.6  High  27.6-33.3  

 

 Mean Corpuscular Hemo Concen  35.8  High  33.0-34.8  

 

 RDW  11.4  Low  11.6-13.7  

 

 Platelets  214 10^3/ul    150-400  

 

 Mean Platelet Volume  8.3    7.4-10.4  









 Ua - Micro ([facility])  2002  Family Medicine  Appearance  CLEAR YELLOW   
   



     (607)-   -          









 Glucose  -      

 

 Bilirubin  -      

 

 Ketones  -      

 

 SP Grav  1.015      

 

 Blood  2+      

 

 PH  5.5      

 

 Protein  -      

 

 Urobil  0.2      

 

 Nitrite  -      

 

 Leukocytes  -      

 

 Hyaline  - /Lpf      

 

 Granular  - /Lpf      

 

 WBC'S  1-3      

 

 RBC'S  0-2      

 

 Mucus  - /Lpf      

 

 Epith  FEW      

 

 Bacteria  1+      

 

 Amorphous  - /Lpf      

 

 Crystals  - /Lpf      









 Comp Metabolic ([facility])  2002  [facility]  Albumin  4.3    3.8-5.5  



     (607)-   -          









 Alkaline Phosphatase  69 U/L      

 

 Bilirubin, Total  0.4 mg/dL    0.2-1.3  

 

 BUN  14    7-26  

 

 Calcium  9.3 mg/dL    8.6-10.0  

 

 Creatinine  0.5 mg/dL  Low  0.6-1.4  

 

 Glucose  78 mg/dL    70 - 118  

 

 Ast Sgot  22 U/L    5-40  

 

 Alt (SGPT)  28    10-40  

 

 Total Protein  6.9 g/dL    6.4-8.3  

 

 Sodium  140    134-149  

 

 Potassium  4.5    3.6-5.5  

 

 Chloride  102 mEq/L      

 

 Co2  28    21-32  

 

 Globulin  2.6    2.0-4.8  

 

 Albumin / Globulin Ratio  1.7    0.6-2.2  

 

 BUN/Creatinin Ratio  28.0    8.0-36  









 Ua - Non Micro ([facility])  2001  [facility]  Appearance  CLEAR LT 
YELLOW      



     (607)-   -          









 Glucose  NEGATIVE      

 

 Bilirubin  NEGATIVE      

 

 Ketones  NEGATIVE      

 

 SP Grav  1.010      

 

 Blood  NEGATIVE      

 

 PH  7.0      

 

 Protein  NEGATIVE      

 

 Urobil  0.2      

 

 Nitrite  NEGATIVE      

 

 Leukocytes  NEGATIVE      









 Laboratory test  10/18/2001  [facility]  Phenytoin  9.1 ug/ml  Low  10 - 20  



 finding    [address] [facility]  ([place])        



     [place], [place] [id number] [phone]          

 

 Lipid Profile  2001  [facility]  Cholesterol  162 mg/dL    140-200  



 ([facility])    (607)-   -          









 Triglyceride  114 mg/dL      

 

 VLDL  23    0-50  

 

 LDL-Calculated  90    0-160  

 

 HDL-Chol  49    35-85  









 CBC With Diff ([facility])  2001  [facility]  WBC  3.7    3.6-9.6  



     (607)-   -          









 Lymphocytes  50.1 %    20.5 - 51.1  

 

 Monocytes  7.9 %    1.7 - 9.3  

 

 Granulocytes  42.0 %  Low  42.2 - 75.2  

 

 Lymphocytes  1.9 10^3/uL    0.7 - 4.9  

 

 Monocytes  0.3 10^3/uL    0.1 - 0.9  

 

 Granulocytes  1.6 10^3/uL    1.5 - 7.2  

 

 RBC  4.76    3.90-5.70  

 

 Hemoglobin  15.2 g/dL    12.1 - 17.2  

 

 Hematocrit  44.0 %    36.1 - 50.3  

 

 Mean Corpuscular Vol  92.5    82.2-97.4  

 

 Mean Corpuscular Hemaglobin  31.9    27.6-33.3  

 

 Mean Corpuscular Hemo Concen  34.5    33.0-34.8  

 

 RDW  12.4    11.6-13.7  

 

 Platelets  180 10^3/ul    150-400  

 

 Mean Platelet Volume  9.3    7.4-10.4  









 Laboratory test  2001  [facility]  Phenytoin  8.9 ug/ml  Low  10 - 20  



 finding    [address] [facility]  (Dilantin)        



     [place] [address] [phone]          

 

 Comp Metabolic  2001  [facility]  Albumin  4.4    3.8-5.5  



 ([facility])    (607)-   -          









 Alkaline Phosphatase  93 U/L      

 

 Bilirubin, Total  0.2 mg/dL    0.2-1.3  

 

 BUN  10    7-26  

 

 Calcium  8.2 mg/dL    7.4-9.2  

 

 Creatinine  0.6 mg/dL    0.6-1.4  

 

 Glucose  84 mg/dL    70 - 118  

 

 Ast Sgot  34 U/L    9-44  

 

 Alt (SGPT)  63  RH  10-40  

 

 Total Protein  7.7 g/dL    6.4-8.3  

 

 Sodium  146    134-149  

 

 Potassium  4.9    3.6-5.5  

 

 Chloride  104 mEq/L      

 

 Co2  28    21-32  

 

 Globulin  3.3    2.0-4.8  

 

 Albumin / Globulin Ratio  1.3    0.6-2.2  

 

 BUN/Creatinin Ratio  16.7    8.0-36  









 Laboratory test  2000  [facility]  Hep BS Antibody  NEGATIVE    Negative  



 finding    [address] [place] [address] [phone]          

 

 Lipid Profile  2000  [facility]  Cholesterol  204 mg/dL  High  140-
200  



 (a)    (607)-   -          









 Triglyceride  100 mg/dL      

 

 HDL-Chol  51.4 mg/dL    30-70  

 

 VLDL  20 mg/dL    0-50  

 

 LDL-Calculated  133    0-160  









 Laboratory test  2000  [facility]  Hep BS  NEGATIVE    Negative  



 finding    [address] [facility]  Antigen        



     [place] [address] [phone]          









 Dilantin (Phenytoin)  6.4 UG/ML  Low  10.0-20.0  

 

 Mumps Igg Abs  1.88    Detail  50

 

 Rubella Igg Antibodies  0.95, CONFIRMED  AB  Detail  51

 

 Rubeola Virus Igg AB (Measles)  2.58    Detail  52









 1  consistent w/ previous results

 

 2  1pour off serum from red t op

 

 3  Detection Limit=0.8



   <0.8 Indicates None Detected



   Patient drug level exceeds published reference range.  Evaluate



   clinically for signs of potential toxicity.

 

 4  FASTING

 

 5  SEE RESULT BELOW



   -----------------------------------------------------------------------------
---------------



   Name:  INO BELLA LUKE               : 1965    Attend Dr: Tanya Gale DO



   Acct:  [id number]  Unit: [id number]  AGE: 53            Location:  ENDO



   Re18                        SEX: F             Status:    DEP REF



   -----------------------------------------------------------------------------
---------------



   



   SPEC: [id number]             PRIYANKA:       [facility] DR: Tanya Gale DO



   REQ:  [id number]             RECD: 171



   STATUS: SOFÍA TAPIA DR: Allan Rodriguez MD



   _



   ORDERED:  LEVEL 4



   



   FINAL DIAGNOSIS



   



   



   Colon, random biopsies:



   -- Mild to moderate collagenous colitis.



   -- No active colitis or evidence of other chronic inflammatory bowel process 
identified.



   



   



   



   CLINICAL HISTORY



   



   52 year old female here for fecal incontinence, diarrhea, anemia



   



   POST-OPERATIVE DIAGNOSIS



   



   Colonoscopy: normal terminal ileum; small non-bleeding internal hemorrhoids; 
no polyps; good



   prep; biopsies for microscopic colitis



   



   GROSS DESCRIPTION



   



   The specimen is received in formalin labeled, Random Colon Biopsies, and 
consists of a 1.0



   by up to 0.7 x 0.2 cm aggregate of tan irregular soft tissue fragments which 
is submitted



   entirely in one cassette.



   



   Signed by and Reported on: __________              Kingsley Abernathy MD  [id number]



   



   -----------------------------------------------------------------------------
---------------



   



   



   



   



   



   



   



   



   



   



   ** END OF REPORT **



   



   DEPARTMENT OF PATHOLOGY,  [address] [place] [address]



   Phone # [phone]      Fax #[phone]



   Kingsley Abernathy M.D. Director     [facility] # [id number]

 

 6  [id number] off serum from [facility]

 

 7  Detection Limit=0.8



   <0.8 Indicates None Detected

 

 8  RESULTS VERIFIED BY REPEAT ANALYSIS

 

 9  consistent w/ previous results

 

 10  SEE RESULT BELOW



   -----------------------------------------------------------------------------
---------------



   Name:  INO BELLA               : 1965    Attend Dr: Mirza Harrell MD



   Acct:  [id number]  Unit: [id number]  AGE: 51            Location:  OR



   Re17                        SEX: F             Status:    CONNOR ARMSTRONG



   -----------------------------------------------------------------------------
---------------



   



   SPEC: [id number]             PRIYANKA: 17-          SUBM DR: Mirza Harrell MD



   REQ:  [id number]             RECD: 17-0



   STATUS: SOFÍA TAPIA DR: Anna Rodriguez MD



   _



   ORDERED:  CONSULT W/ FS, FS /, LEVEL IV



   



   FINAL DIAGNOSIS



   



   



   Skin, left shoulder, excision:



   -- Scar and residual basal cell carcinoma, superficial and nodular type.



   -- All margins are clear.



   



   



   The previous lesion at this site ([id number]) has been



   completely excised.



   



   PATHOLOGY SURGICAL CONSULT



   



   Frozen section (FS)/Touch Prep (TP)/Gross Consult (GC)



   



   FS) Skin, left anterior shoulder, excision:



   a. Residual basal cell carcinoma. (EP)



   b. All margins clear. (EP)



   



   Findings discussed with Dr Harrell at 1334 on 3/21/17.



   



   PRE-OPERATIVE DIAGNOSIS



   



   Basal cell carcinoma left shoulder; suture marks 12 o'clock superior margin



   



   GROSS DESCRIPTION



   



   The specimen is received fresh labeled, Excision Left Anterior Shoulder, and 
consists of a



   5.3 x 4.4 cm tan-white ovoid portion of skin excised to a depth of 1.4 cm 
with a central 1.6



   x 1.1 by up to 0.3 cm tan-red nodular area.  There is an attached suture, 
which as per the



   accompanying requisition designates the 12:00 superior margin.  The specimen 
is inked as



   follows: 12:00 half black, 6:00 half blue and 3:00 end green, serially 
sectioned from 3:00



   to 9:00 and representative sections of the 12:00 and 6:00 margins are 
submitted for frozen



   section microscopy.  The frozen section residue and the remaining specimen 
are entirely



   submitted in cassettes FSA through FSJ and K through X to include 3:00 end 
in cassette K and



   9:00 end in cassette X.



   



   ** CONTINUED ON NEXT PAGE **



   



   * ML=Testing performed at Main Lab



   DEPARTMENT OF PATHOLOGY,  [address] [place] [address]



   Phone # [phone]      Fax #[phone]



   Kingsley Abernathy M.D. Director     [facility] # [id number]



   



   



   



   RUN DATE: 17               [facility] **LIVE**         
       PAGE    2



   



   -----------------------------------------------------------------------------
---------------



   Patient: INO BELLA                [id number]     (Continued)



   -----------------------------------------------------------------------------
---------------



   



   GROSS DESCRIPTION          (Continued)



   



   



   Signed __________(signature on file)___________ Tahira Springer MD  [id number]



   



   -----------------------------------------------------------------------------
---------------



   



   



   



   



   



   



   



   



   



   



   



   



   



   



   



   



   



   



   



   



   



   



   



   



   



   



   



   



   



   



   



   



   



   



   



   



   



   



   



   ** END OF REPORT **



   



   * ML=Testing performed at Main Lab



   DEPARTMENT OF PATHOLOGY,  [address] [place] [address]



   Phone # [phone]      Fax #[phone]



   Kingsley Abernathy M.D. Director     [facility] # [id number]

 

 11  [id number]

 

 12  SEE RESULT BELOW



   -----------------------------------------------------------------------------
---------------



   Name:  INO BELLA               : 1965    Attend Dr: Sven Solorzano MD



   Acct:  [id number]  Unit: [id number]  AGE: 51            Location:  [facility]



   Re16                        SEX: F             Status:    REG REF



   -----------------------------------------------------------------------------
---------------



   



   SPEC: [id number]             PRIYANKA:       [facility] DR: Sven Solorzano MD



   REQ:  [id number]             RECD: 160



   STATUS: SOFÍA TAPIA DR: Allan Rodriguez MD



   _



   ORDERED:  LEVEL IV



   COMMENTS: [id number]



   



   FINAL DIAGNOSIS



   



   



   Esophagus, distal, biopsy:



   -- Squamous and columnar mucosa with chronic inflammation and focal 
intestinal metaplasia.



   -- Negative for dysplasia.



   



   



   



   CLINICAL HISTORY



   



   Dysphagia, esophagitis, history of Jeffries's



   



   POST-OPERATIVE DIAGNOSIS



   



   Esophagus - short segment Jeffries's biopsied, slight Schatzki's ring dilated
; stomach -



   hiatal hernia, otherwise normal gastric mucosa; duodenum - normal bulb to 
third portion.



   Conclusions/Plan: Short segment Jeffries's biopsied, slight Schatzki's ring 
dilated, hiatal



   hernia



   



   GROSS DESCRIPTION



   



   The specimen is received in formalin labeled, Biopsy Distal Esophagus, and 
consists of a 0.7



   x 0.6 x 0.2 cm aggregate of tan irregular soft tissue fragments, which is 
submitted entirely



   in one cassette.



   



   Signed __________(signature on file)___________ Tahira Springer MD  [id number]



   



   -----------------------------------------------------------------------------
---------------



   



   



   



   



   



   



   



   ** END OF REPORT **



   



   * ML=Testing performed at Main Lab



   DEPARTMENT OF PATHOLOGY,  [address] [place] [address]



   Phone # [phone]      Fax #[phone]



   Kingsley Abernathy M.D. Director     [facility] # [id number]

 

 13  RESULTS VERIFIED BY REPEAT ANALYSIS

 

 14  1 sst poured off serum

 

 15  :



   Therapeutic 6.0 - 14.0



   Detection Limit=0.8



   <0.8 Indicates None Detected

 

 16  SEE RESULT BELOW



   -----------------------------------------------------------------------------
---------------



   Name:  INO BELLA               : 1965    Attend Dr: Sven Solorzano MD



   Acct:  [id number]  Unit: [id number]  AGE: 50            Location:  [facility]



   Re/15/15                        SEX: F             Status:    REG REF



   -----------------------------------------------------------------------------
---------------



   



   SPEC: [id number]             PRIYANKA: 06/15/15-          SUBM DR: Sven Solorzano MD



   REQ:  [id number]             RECD: 06/15/



   STATUS: SOFÍA TAPIA DR: Allan Rodriguez MD



   _



   ORDERED:  LEVEL IV



   



   FINAL DIAGNOSIS



   



   



   Gastroesophageal junction, biopsy:



   -- Focally eroded squamous and columnar mucosa with acute and chronic 
inflammation and



   intestinal metaplasia.



   -- Negative for dysplasia.



   



   



   



   



   CLINICAL HISTORY



   



   Gastroesophageal reflux disease, dysphagia



   



   POST-OPERATIVE DIAGNOSIS



   



   Esophagus - stricture at GE junction, dilated, ? Jeffries's biopsied, 
negative esophagitis;



   stomach - 3 cm. hiatal hernia, normal gastric mucosa; duodenum - normal bulb 
to third



   portion. Esophageal stricture dilated, hiatal hernia



   



   GROSS DESCRIPTION



   



   The specimen is received in formalin labeled, Biopsy GE Junction, and 
consists of a 0.6 x



   0.4 x 0.2 cm aggregate of multiple tan irregular soft tissue fragments, 
which is submitted



   entirely in one cassette.



   



   Signed __________(signature on file)___________ Tahira Springer MD 06/
17/15 1148



   



   -----------------------------------------------------------------------------
---------------



   



   



   



   



   



   



   



   ** END OF REPORT **



   



   * ML=Testing performed at Main Lab



   DEPARTMENT OF PATHOLOGY,  [facility] [place], [place] [address]



   Phone # [phone]      Fax #[phone]



   Kingsley Abernathy M.D. Director     [facility] # [id number]

 

 17  1 pour off serum from RTT

 

 18  *******Because ethnic data is not always readily available,



   this report includes an eGFR for both -Americans and



   non- Americans.****



   The National Kidney Disease Education Program (NKDEP) does



   not endorse the use of the MDRD equation for patients that



   are not between the ages of 18 and 70, are pregnant, have



   extremes of body size, muscle mass, or nutritional status,



   or are non- or non-.



   According to the National Kidney Foundation, irrespective of



   diagnosis, the stage of the disease is based on the level of



   kidney function:



   Stage Description                      GFR(mL/min/1.73 m(2))



   1     Kidney damage with normal or decreased GFR       90



   2     Kidney damage with mild decrease in GFR          60-89



   3     Moderate decrease in GFR                         30-59



   4     Severe decrease in GFR                           15-29



   5     Kidney failure                       <15 (or dialysis)

 

 19  RUN DATE: 05/07/15               [facility] **LIVE**       
         PAGE    1



   RUN TIME:              [facility] [place], [place]   [address]



   Specimen Inquiry



   



   -----------------------------------------------------------------------------
---------------



   Name:  INO BELLA               : 1965    Attend Dr: Nain Sanders MD



   Acct:  [id number]  Unit: [id number]  AGE: 49            Location:  ED



   Re/07/15                        SEX: F             Status:    REG ER



   -----------------------------------------------------------------------------
---------------



   



   SPEC: [id number]         PRIYANKA:   05/07/    [facility] DR: Nain Sanders MD



   REQ:  [id number]              RECD:   05/07/



   STATUS: SOHA TAPIA DR: Allan Rodriguez MD



   _



   SOURCE: STOOL          SPDESC:



   ORDERED:  Hemoccult



   



   -----------------------------------------------------------------------------
---------------



   Procedure                         Result                         Verified   
        Site



   -----------------------------------------------------------------------------
---------------



   Stool Specimen Description  Final                                05/07/15-
      ML



   Test not performed



   



   Stool Occult Blood  Final                                        05/07/15-
      ML



   Stool Occult Blood          Negative



   



   -----------------------------------------------------------------------------
---------------



   * ML - MAIN LAB ([facility])



   .



   



   



   



   



   



   



   



   



   



   



   



   



   



   



   



   



   



   



   



   



   



   



   



   



   ** END OF REPORT **



   



   * ML=Testing performed at Main Lab



   DEPARTMENT OF PATHOLOGY,  [address] [place] [address]



   Phone # [phone]      Fax #[phone]



   Kingsley Abernathy M.D. Director     [facility] # [id number]

 

 20  NO LAV RECIEVED CLARY NOTIFIED @1020

 

 21  Verbal to [id number] ED by [id number] at 1855 on 05/07/15.



   Results read back accurately.

 

 22  1 bortex; 1 red top poured off refrigerated

 

 23  Escherichia coli



   >100,000 col/ml



    URINE CULTURE organism 1 Escherichia coli >100,000 col/ml



   Amikacin                      <=2                 Susceptible



   Amoxicillin/CA                8                   Susceptible



   Ampicillin                    >=32                Resistant



   Aztreonam                     <=1                 Susceptible



   Cefazolin                     <=4                 Susceptible



   Cefoxitin                     <=4                 Susceptible



   Ciprofloxacin                 <=0.25              Susceptible



   Ertapenem                     <=0.5               Susceptible



   Gentamicin                    <=1                 Susceptible



   Imipenem                      <=0.25              Susceptible



   Levofloxacin                  <=0.12              Susceptible



   Nitrofurantoin                <=16                Susceptible



   Piperacillin/tazobactam       <=4                 Susceptible



   Trimethoprim/Sulfa            <=20                Susceptible

 

 24  result carl'd

 

 25  [address]  [address]

 

 26  Anion gap measurement may be of limited value in the



   presence of any alkalosis, especially in a combined acid



   base disorder.



   .

 

 27  A metabolite of Naproxen, O-desmethylnaproxen, has been



   shown to interfere with the Jendrassik-Ama method for



   measuring total bilirubin.  Samples from patients who have



   taken Naproxen have shown spurious elevation in total



   bilirubin levels.

 

 28  *******Because ethnic data is not always readily available,



   this report includes an eGFR for both -Americans and



   non- Americans.****



   The National Kidney Disease Education Program (NKDEP) does



   not endorse the use of the MDRD equation for patients that



   are not between the ages of 18 and 70, are pregnant, have



   extremes of body size, muscle mass, or nutritional status,



   or are non- or non-.



   According to the National Kidney Foundation, irrespective of



   diagnosis, the stage of the disease is based on the level of



   kidney function:



   Stage Description                      GFR(mL/min/1.73 m(2))



   1     Kidney damage with normal or decreased GFR       90



   2     Kidney damage with mild decrease in GFR          60-89



   3     Moderate decrease in GFR                         30-59



   4     Severe decrease in GFR                           15-29



   5     Kidney failure                       <15 (or dialysis)

 

 29  ************************************************************



   The detection limit for PHENYTOIN is 2.5 mcg/ml .  Values



   less than 2.5 mcg/ml cannot be accurately measured.



   



   .

 

 30  RESULT CARL'D

 

 31  result carl'd

 

 32  1 POUR OFF TUBE

 

 33  Relative.ai.



                           DEPARTMENT OF PATHOLOGY



               [phone] or [phone] Extension [phone]



   



                             GYN CYTOLOGY REPORT



   



   PATIENT: KATI BELLA            : 1965 AGE: 45 Y SEX: F



   MRN: [id number]                    ACCT: [id number]



   PROCEDURE DATE: 2011         DATE RECEIVED: 2011



     REQUESTING PHYSICIAN: WAYNE CAROF



     [place]  [facility]



   



                        Case No. [id number]



   



   



   PATIENT DATA:



   [id number]



   



   



   SPECIMEN SUBMITTED:



   * * (HPVII) THIN PREP W/HPV (LSIL/ASC/ALEA) * * ENDOCERVICAL



   



   RELEVANT HISTORY:



   LMP: 2011                    Contraceptive: NONE



   Menarche: Y                        Prev.normal: 2006



   



   



   SPECIMEN ADEQUACY



   SATISFACTORY FOR EVALUATION, ENDOCERVICAL TRANSFORMATION ZONE COMPONENT



   PRESENT



   



   GENERAL CATEGORIZATION



   NEGATIVE FOR INTRAEPITHELIAL LESIONS OR MALIGNANCY



   



   



   ADDITIONAL COPIES SENT TO:



   



   



   



   Screened/Rescreened    Electronically Signed  Sign Out Date/Time:



   by:                    by:



   



   CHERYL HARRELL,       2011 12:49



                          CT(ASCP)



   



   



   Thin Prep Pap tests are examined with an FDA-approved location-guidance



   system (87063).



   Performed @ [facility] [address] [place] [address]

 

 34  1 RED TOP TUBE

 

 35  1 POUR OFF TUBE WITH SERUM

 

 36  result carl'd

 

 37  JAC[facility] [address]  [address]

 

 38  Anion gap measurement may be of limited value in the



   presence of any alkalosis, especially in a combined acid



   base disorder.



   .

 

 39  ** Note change in reference range as of 08.  The



   change was based on recommendations from the American



   Diabetes Association.

 

 40  Please note change in reference range effective 08



   



   



   .

 

 41  A metabolite of Naproxen, O-desmethylnaproxen, has been



   shown to interfere with the Miriam-[place]view method for



   measuring total bilirubin.  Samples from patients who have



   taken Naproxen have shown spurious elevation in total



   bilirubin levels.

 

 42  ************************************************************



   The detection limit for PHENYTOIN is 2.5 mcg/ml .  Values



   less than 2.5 mcg/ml cannot be accurately measured.



   



   .

 

 43  Anion gap measurement may be of limited value in the



   presence of any alkalosis, especially in a combined acid



   base disorder.



   .

 

 44  Please note change in reference range effective 08



   



   



   .

 

 45  ************************************************************



   The detection limit for PHENYTOIN is 2.5 mcg/ml .  Values



   less than 2.5 mcg/ml cannot be accurately measured.



   



   .

 

 46  PLEASE NOTE NEW REFERENCE RANGES.

 

 47  Anion gap measurement may be of limited value in the



   presence of any alkalosis, especially in a combined acid



   base disorder.



   .

 

 48  ************************************************************



   The detection limit for PHENYTOIN is 2.5 mcg/ml .  Values



   less than 2.5 mcg/ml cannot be accurately measured.



   



   .

 

 49  1 SERUM POUR OFF

 

 50  [facility] MUMPS REFERENCE RANGE



   



   < 0.90     NEG (NO IMMUNITY)



   .91-1.09   EQUIVOCAL



   >=1.10     POSITIVE

 

 51  REFERENCE RANGE



   



   CUTOFF POINTS FOR RUBELLA IGG ABS:



   



   LESS THAN 0.9:     NEGATIVE (NO IMMUNITY)



   0.9-1.09   EQUIVOCAL



   GREATER THAN OR=1.1 POSITIVE

 

 52  REFERENCE RANGE



   



   CUTOFF POINTS FOR RUBEOLLA IGG ABS:



   



   LESS THAN 0.9:     NEGATIVE (NO IMMUNITY)



   0.9-1.09   EQUIVOCAL



   GREATER THAN OR=1.1 POSITIVE







Procedures







 Date  Code  Description  Status

 

 2018  [id number]  Mammogram  Completed

 

 2018  [id number]  Colonoscopy  Completed

 

 2017  [id number]  Mammogram  Completed

 

 2017  [id number]  Mammogram  Completed

 

 2016  [id number]  Mammogram  Completed

 

 2015  [id number]  Mammogram  Completed

 

 05/15/2014  [id number]  Mammogram  Completed

 

 2013  [id number]  Bone Mineral Density Test  Completed

 

 2013  [id number]  Mammogram  Completed

 

 2012  [id number]  Mammogram  Completed

 

 2011  [id number]  Mammogram  Completed

 

 2010  [id number]  Mammogram  Completed

 

 2008  [id number]  Mammogram  Completed

 

 2007  [id number]  Mammogram  Completed

 

 2006  [id number]  Mammogram  Completed







Encounters







 Type  Date  Location  Provider  Dx  Diagnosis

 

 Office Visit  2018  [facility] Office  Julieth Dobson  R15.9  Full 
incontinence



   9:00a    VICTOR M Whaley    of feces

 

 Office Visit  2017  [facility] Office  Jacquelyn Stephens,  Z01.419  Encntr for 
gyn exam



   1:00p    FNP    (general) (routine)



           w/o abn findings









 Z12.31  Encntr screen mammogram for malignant neoplasm of breast









 Office Visit  2017  2:40p  Main Office  Allan HOLT  G80.9  Cerebral palsyJennifer M.D.    unspecified









 K21.0  Gastro-esophageal reflux disease with esophagitis

 

 R13.10  Dysphagia, unspecified









 Office Visit  2016  3:20p  Main Office  Allan Rodriguez,  M25.562  
Pain in left



       M.D.    knee









 R21  Rash and other nonspecific skin eruption

 

 I89.0  Lymphedema, not elsewhere classified









 Office Visit  2016  3:20p  Main Office  Allan Rodriguez,  Z00.00  
Encntr for



       M.D.    general adult



           medical exam w/o



           abnormal findings









 G80.9  Cerebral palsy, unspecified

 

 K21.0  Gastro-esophageal reflux disease with esophagitis

 

 R56.9  Unspecified convulsions

 

 E03.9  Hypothyroidism, unspecified









 Office Visit  2016  2:10p  Main Office  Allan HOLT  M79.674  Pain in right



       MARINA Rodriguez    toe(s)

 

 Office Visit  2015 12:30p  Main Office  Allan HOLT  M25.571  Pain in right



       MARINA Rodriguez    ankle and joints



           of right foot

 

 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



       MARINA Rodriguez    Cerebral Palsy



           Unspec









 530.11  Esophagitis Reflux









 Office Visit  07/10/2015  1:40p  Main Office  Allan Rodriguez,  802.0  FX 
Nasal Bones



       M.D.    Closed









 E885.9  Fall From Other Slipping,Tripping, Or Stumbling









 Office Visit  2015  3:10p  [facility]  Allan HOLT  530.11  
Esophagitis



       MARINA Rodriguez    Reflux

 

 Office Visit  2015  2:10p  Main Office  Allan HOLT  530.11  Esophagitis



       MARINA Rodriguez    Reflux









 780.39  Convulsions Other









 Office Visit  2015  2:15p  Main Office  JAGUAR Galvan  343.9  
Infantile Cerebral



           Palsy Unspec









 V70.3  Examination Other Medical For Administrative Purpose









 Office Visit  2015 11:00a  Main Office  Allan HOLT  558.2  
Gastroenteritis &



       MARINA Rodriguez    Colitis Toxic

 

 Office Visit  2014  2:10p  Main Office  Allan HOLT  244.9  Hypothyroidism 
Other



       MARINA Rodriguez    Unspec









 784.91  Postnasal Drip









 Office Visit  2014  2:00p  Main Office  Jacquelyn Stephens  719.47  Pain 
Joint Ankle



       FNP    & Foot

 

 Office Visit  07/15/2014  4:00p  [facility] Office  Jacquelyn Stephens  719.47  Pain 
Joint Ankle



       FNP    & Foot

 

 Office Visit  2014  9:00a  [facility]  Jacquelyn Stephens  V70.0  
Examination



       FNP    General Medical



           Routine AT Health



           Care Facility









 244.9  Hypothyroidism Other Unspec

 

 272.0  Hypercholesterolemia Pure

 

 599.0  UTI Urinary Tract Infection Site Not Spec

 

 780.39  Convulsions Other

 

 788.30  Incontinence Urinary Unspec









 Office Visit  2014  9:15a  Main Office  Carissa Arciniega,  727.03  Trigger 
Finger



       Afnp-C    Acquired

 

 Office Visit  2014  3:40p  Main Office  Allan HOLT  719.46  Pain Joint 
Lower



       MARINA Rodriguez    Leg

 

 Office Visit  02/10/2014  3:40p  Main Office  Allan HOLT  729.81  Swelling Of 
Limb



       MARINA Rodriguez    

 

 Office Visit  2013 10:00a  [facility]  Jacquelyn Stephens  V70.0  
Examination



       FNP    General Medical



           Routine AT Health



           Care Facility









 780.39  Convulsions Other









 Office Visit  2012 10:00a  [facility]  Jacquelyn Stephens  V70.0  
Examination



       FNP    General Medical



           Routine AT Health



           Care Facility

 

 Office Visit  2012  3:30p  [facility]  Jacquelyn Stephens,  599.0  UTI 
Urinary Tract



       FNP    Infection Site



           Not Spec

 

 Office Visit  2011  9:00a  [facility]  Jacquelyn Stephens,  783.21  Loss 
Of Weight



       FNP    









 300.00  Anxiety State Unspec









 Office Visit  10/27/2011  9:15a  [facility]  Jacquelyn Stephens,  300.00  
Anxiety State



       FNP    Unspec









 783.21  Loss Of Weight









 Office Visit  2011 11:00a  [facility]  Jacquelyn Stephens,  300.00  
Anxiety State



       FNP    Unspec









 783.21  Loss Of Weight









 Office Visit  2011 11:40a  [facility]  Ollie VELEZ  465.9  URI  
Dayo Gaitan M.D.    Respiratory



           Infections Acute



           Unspec Sites

 

 Office Visit  2011 10:00a  [facility]  Jacquelyn Stephens,  V72.31  
Routine Gyn



       FNP    Examination









 780.39  Convulsions Other

 

 599.70  Hematuria, Unspecified

 

 V70.0  Examination General Medical Routine AT Health Care Facility









 Office Visit  2010  3:30p  [facility]  Jacquelyn Stephens,  300.00  
Anxiety State



       FNP    Unspec









 706.2  Sebaceous Cyst









 Office Visit  2010  3:00p  [facility]  Thu  995.3  Allergy 
Unspec



       [place], Afnp-C    

 

 Office Visit  2010  3:30p  [facility]  Jacquelyn Stephens,  300.00  
Anxiety State



       FNP    Unspec

 

 Office Visit  2010  2:00p  [facility]  Jacquelyn Angelo,  300.00  
Anxiety State



       FNP    Unspec

 

 Office Visit  2010  1:00p  [facility]  Jacquelyn Angelo,  300.00  
Anxiety State



       FNP    Unspec









 V06.5  Tetanus Diphtheria (DT)









 Office Visit  2009 10:30a  [facility]  Jacquelyn Aguayor, FNP  719.47  
Pain Joint



           Ankle & Foot

 

 Office Visit  2009  2:00p  [facility]  Jacquelyn Aguayor, FNP  782.3  
Edema

 

 Office Visit  2009  2:45p  [facility]  Jacquelyn Montoyarer, FNP  782.3  
Edema









 719.47  Pain Joint Ankle & Foot









 Office Visit  2009  3:30p  [facility] Office  Jacquelyn Stephens,  V72.31  Routine 
Gyn



       FNP    Examination

 

 Office Visit  2009  2:30p  [facility] Office  Jacquelyn Stephens,  V70.3  
Examination Other



       FNP    Medical For



           Administrative



           Purpose









 343.9  Infantile Cerebral Palsy Unspec

 

 788.30  Incontinence Urinary Unspec

 

 780.39  Convulsions Other

 

 V76.49  Special Screening For Malignant Neoplasms, Other Sites









 Office Visit  2008  2:30p  Main Office  RIO GalvanP  343.9  
Infantile Cerebral



           Palsy Unspec









 780.39  Convulsions Other

 

 788.30  Incontinence Urinary Unspec

 

 V10.3  History Personal Malignant Neoplasm Breast

 

 V70.3  Examination Other Medical For Administrative Purpose









 Office Visit  2007  2:20p  [facility] Office  Allan Rodriguez,  611.8  
Breast Disorders



       M.D.    Other Spec

 

 Office Visit  2007  2:30p  [facility] Office  JAGUAR Galvan  343.9  
Infantile Cerebral



           Palsy Unspec









 780.39  Convulsions Other

 

 788.30  Incontinence Urinary Unspec

 

 V10.3  History Personal Malignant Neoplasm Breast









 Office Visit  08/10/2006  9:20a  Main Office  Allan Rodriguez,  783.21  Loss 
Of Weight



       M.D.    









 343.9  Infantile Cerebral Palsy Unspec









 Office Visit  2006  9:40a  [facility] Office  Allan HOLT  783.21  Loss Of Weight



       MARINA Rodriguez    

 

 Office Visit  2006  2:30p  [facility] Office  Jacquelyn Stephens  V72.31  Routine 
Gyn



       FNP    Examination









 343.9  Infantile Cerebral Palsy Unspec

 

 V77.0  Screening Thyroid Disorders

 

 780.79  Malaise And Fatigue Other









 Office Visit  2006  4:15p  [facility]  Thu  465.9  URI  Upper



       [place], Afnp-C    Respiratory



           Infections Acute



           Unspec Sites









 490  Bronchitis Acute Or Chronic Not Spec









 Office Visit  2005  2:40p  [facility] Office  Allan Rodriguez,  343.9  
Infantile Cerebral



       M.D.    Palsy Unspec









 V70.3  Examination Other Medical For Administrative Purpose









 Office Visit  2005  [facility] Office  Jacquelyn Stephens  V72.31  Routine Gyn



   11:00a    FNP    Examination

 

 Office Visit  02/10/2005  [facility]  Jacquelyn Stephens,  845.10  Sprains & Strains



   3:00p  Office  FNP    Foot Unspec Site

 

 Office Visit  2004  [facility] Office  Allan HOLT  780.39  Convulsions Other



   7:20p    MARINA Rodriguez    

 

 Office Visit  05/10/2004  [facility] Office  Jacquelyn Stephens  V72.3  Examination



   6:30p    FNP    Gynecological

 

 Office Visit  2003  [facility] Office  Allan HOLT  V70.5  Examination Health



   3:20p    MARINA Rodriguez    Of Defined



           Subpopulations

 

 Office Visit  2002  [facility]  Allan HOLT    



   3:20p  Office  MARINA Rodriguez    

 

 Office Visit  2001  [facility] Office  Allan HOLT    



   10:10a    MARINA Rodriguez    

 

 Office Visit  2001  [facility] Office  Allan HOLT    



   10:10a    MARINA Rodriguez    

 

 Office Visit  2001  [facility] Office  Jacquelyn Stephens,    



   11:15a    FNP    

 

 Office Visit  2001  [facility] Office  Allan HOLT    



   10:20a    MARINA Rodriguez    

 

 Office Visit  10/20/2000  [facility] Office  Allan HOLT    



   9:00a    MARINA Rodriguez    







Plan of Treatment

2019 - Tahira Mann, FNPR21 Rash and other nonspecific skin 
kuiymaciR53.9 Pruritus, unspecifiedNew Medication:Benadryl Allergy 25 mg - 1 po 
q 12 hrs prn itching

## 2019-02-04 NOTE — UC
- EKG/XRAY/CT


Xray Comments: wet read correct





Course/Dx





- Diagnoses


Provider Diagnoses: 


 Contusion of right foot, initial encounter








Discharge





- Sign-Out/Discharge


Documenting (check all that apply): Post-Discharge Follow Up


All imaging exams completed and their final reports reviewed: Yes





- Discharge Plan


Condition: Good


Disposition: HOME


Patient Education Materials:  Foot Contusion (ED)


Referrals: 


Allan Rodriguez MD [Primary Care Provider] - 





- Billing Disposition and Condition


Condition: GOOD


Disposition: Home